# Patient Record
Sex: FEMALE | HISPANIC OR LATINO | Employment: FULL TIME
[De-identification: names, ages, dates, MRNs, and addresses within clinical notes are randomized per-mention and may not be internally consistent; named-entity substitution may affect disease eponyms.]

---

## 2017-07-08 ENCOUNTER — HEALTH MAINTENANCE LETTER (OUTPATIENT)
Age: 42
End: 2017-07-08

## 2019-11-02 ENCOUNTER — HEALTH MAINTENANCE LETTER (OUTPATIENT)
Age: 44
End: 2019-11-02

## 2020-02-23 ASSESSMENT — ENCOUNTER SYMPTOMS
HEADACHES: 0
EYE PAIN: 0
DIARRHEA: 0
COUGH: 0
CHILLS: 0
BREAST MASS: 0
ABDOMINAL PAIN: 0
HEMATOCHEZIA: 0
DIZZINESS: 0
CONSTIPATION: 0
FREQUENCY: 0
FEVER: 0
HEMATURIA: 0
NERVOUS/ANXIOUS: 0

## 2020-02-26 ENCOUNTER — OFFICE VISIT (OUTPATIENT)
Dept: PEDIATRICS | Facility: CLINIC | Age: 45
End: 2020-02-26
Payer: COMMERCIAL

## 2020-02-26 VITALS
OXYGEN SATURATION: 100 % | TEMPERATURE: 98.2 F | BODY MASS INDEX: 26.96 KG/M2 | DIASTOLIC BLOOD PRESSURE: 78 MMHG | HEART RATE: 74 BPM | WEIGHT: 137.3 LBS | SYSTOLIC BLOOD PRESSURE: 108 MMHG | HEIGHT: 60 IN | RESPIRATION RATE: 16 BRPM

## 2020-02-26 DIAGNOSIS — Z12.31 ENCOUNTER FOR SCREENING MAMMOGRAM FOR BREAST CANCER: ICD-10-CM

## 2020-02-26 DIAGNOSIS — Z83.3 FAMILY HISTORY OF DIABETES MELLITUS: Primary | ICD-10-CM

## 2020-02-26 DIAGNOSIS — Z00.00 ROUTINE GENERAL MEDICAL EXAMINATION AT A HEALTH CARE FACILITY: ICD-10-CM

## 2020-02-26 DIAGNOSIS — Z12.4 SCREENING FOR MALIGNANT NEOPLASM OF CERVIX: ICD-10-CM

## 2020-02-26 LAB
ANION GAP SERPL CALCULATED.3IONS-SCNC: 6 MMOL/L (ref 3–14)
BUN SERPL-MCNC: 16 MG/DL (ref 7–30)
CALCIUM SERPL-MCNC: 9.1 MG/DL (ref 8.5–10.1)
CHLORIDE SERPL-SCNC: 105 MMOL/L (ref 94–109)
CHOLEST SERPL-MCNC: 263 MG/DL
CO2 SERPL-SCNC: 24 MMOL/L (ref 20–32)
CREAT SERPL-MCNC: 0.67 MG/DL (ref 0.52–1.04)
GFR SERPL CREATININE-BSD FRML MDRD: >90 ML/MIN/{1.73_M2}
GLUCOSE SERPL-MCNC: 87 MG/DL (ref 70–99)
HBA1C MFR BLD: 5.5 % (ref 0–5.6)
HDLC SERPL-MCNC: 83 MG/DL
LDLC SERPL CALC-MCNC: 163 MG/DL
NONHDLC SERPL-MCNC: 180 MG/DL
POTASSIUM SERPL-SCNC: 3.9 MMOL/L (ref 3.4–5.3)
SODIUM SERPL-SCNC: 135 MMOL/L (ref 133–144)
TRIGL SERPL-MCNC: 83 MG/DL

## 2020-02-26 PROCEDURE — 87624 HPV HI-RISK TYP POOLED RSLT: CPT | Performed by: NURSE PRACTITIONER

## 2020-02-26 PROCEDURE — 80061 LIPID PANEL: CPT | Performed by: NURSE PRACTITIONER

## 2020-02-26 PROCEDURE — 80048 BASIC METABOLIC PNL TOTAL CA: CPT | Performed by: NURSE PRACTITIONER

## 2020-02-26 PROCEDURE — 99386 PREV VISIT NEW AGE 40-64: CPT | Performed by: NURSE PRACTITIONER

## 2020-02-26 PROCEDURE — 83036 HEMOGLOBIN GLYCOSYLATED A1C: CPT | Performed by: NURSE PRACTITIONER

## 2020-02-26 PROCEDURE — G0145 SCR C/V CYTO,THINLAYER,RESCR: HCPCS | Performed by: NURSE PRACTITIONER

## 2020-02-26 PROCEDURE — 36415 COLL VENOUS BLD VENIPUNCTURE: CPT | Performed by: NURSE PRACTITIONER

## 2020-02-26 ASSESSMENT — ENCOUNTER SYMPTOMS
CONSTIPATION: 0
COUGH: 0
DIARRHEA: 0
DIZZINESS: 0
FEVER: 0
BREAST MASS: 0
HEMATOCHEZIA: 0
FREQUENCY: 0
NERVOUS/ANXIOUS: 0
ABDOMINAL PAIN: 0
HEADACHES: 0
EYE PAIN: 0
CHILLS: 0
HEMATURIA: 0

## 2020-02-26 ASSESSMENT — MIFFLIN-ST. JEOR: SCORE: 1184.29

## 2020-02-26 NOTE — PROGRESS NOTES
SUBJECTIVE:   CC: Bekah Paez is an 44 year old woman who presents for preventive health visit.     Healthy Habits:     Getting at least 3 servings of Calcium per day:  Yes    Bi-annual eye exam:  Yes    Dental care twice a year:  Yes    Sleep apnea or symptoms of sleep apnea:  None    Diet:  Regular (no restrictions)    Frequency of exercise:  2-3 days/week    Duration of exercise:  45-60 minutes    Taking medications regularly:  Yes    Medication side effects:  Not applicable    PHQ-2 Total Score: 0    Additional concerns today:  No    She works for [employer]  Has not been to a clinic in several years  Denies any concerns today  Periods are regular  Uses condoms for pregnancy prevention  Asks about birth control  Family history of diabetes      Today's PHQ-2 Score:   PHQ-2 ( 1999 [employer]) 2/23/2020   Q1: Little interest or pleasure in doing things 0   Q2: Feeling down, depressed or hopeless 0   PHQ-2 Score 0   Q1: Little interest or pleasure in doing things Not at all   Q2: Feeling down, depressed or hopeless Not at all   PHQ-2 Score 0       Abuse: Current or Past(Physical, Sexual or Emotional)- No  Do you feel safe in your environment? Yes        Social History     Tobacco Use     Smoking status: Never Smoker     Smokeless tobacco: Never Used   Substance Use Topics     Alcohol use: Yes     Comment: social drinker, hh with friends, parties, etc. 2 drinks max         Alcohol Use 2/23/2020   Prescreen: >3 drinks/day or >7 drinks/week? No       Reviewed orders with patient.  Reviewed health maintenance and updated orders accordingly - Yes  Lab work is in process  Labs reviewed in EPIC  BP Readings from Last 3 Encounters:   02/26/20 108/78   01/14/14 114/77   09/21/12 98/68    Wt Readings from Last 3 Encounters:   02/26/20 62.3 kg (137 lb 4.8 oz)   09/21/12 55.5 kg (122 lb 6.4 oz)   08/10/12 66.7 kg (147 lb)                  Patient Active Problem List   Diagnosis     Allergic rhinitis     Lumbago      Nonallopathic lesion of sacral region     Nonallopathic lesion of thoracic region     CARDIOVASCULAR SCREENING; LDL GOAL LESS THAN 160     Blood type A+     History reviewed. No pertinent surgical history.    Social History     Tobacco Use     Smoking status: Never Smoker     Smokeless tobacco: Never Used   Substance Use Topics     Alcohol use: Yes     Comment: social drinker, hh with friends, parties, etc. 2 drinks max     Family History   Problem Relation Age of Onset     Lipids Mother      Diabetes Mother      Hypertension Mother      Depression Mother      Obesity Mother      Cerebrovascular Disease Father      Diabetes Brother          No current outpatient medications on file.       Mammogram Screening: Patient under age 50, mutual decision reflected in health maintenance.      Pertinent mammograms are reviewed under the imaging tab.  History of abnormal Pap smear: NO - age 30-65 PAP every 5 years with negative HPV co-testing recommended  PAP / HPV 1/27/2012 11/2/2009 7/11/2008   PAP NIL ASC-US(A) NIL     Reviewed and updated as needed this visit by clinical staff  Tobacco  Allergies  Med Hx  Surg Hx  Fam Hx  Soc Hx        Reviewed and updated as needed this visit by Provider        Past Medical History:   Diagnosis Date     Allergic rhinitis, cause unspecified      Pure hypercholesterolemia         Review of Systems   Constitutional: Negative for chills and fever.   HENT: Negative for congestion and ear pain.    Eyes: Negative for pain.   Respiratory: Negative for cough.    Cardiovascular: Negative for chest pain.   Gastrointestinal: Negative for abdominal pain, constipation, diarrhea and hematochezia.   Breasts:  Negative for breast mass.   Genitourinary: Negative for frequency, genital sores, hematuria, pelvic pain and vaginal discharge.   Neurological: Negative for dizziness and headaches.   Psychiatric/Behavioral: The patient is not nervous/anxious.         OBJECTIVE:   /78 (BP Location:  Right arm, Patient Position: Chair, Cuff Size: Adult Regular)   Pulse 74   Temp 98.2  F (36.8  C) (Oral)   Resp 16   Ht 1.524 m (5')   Wt 62.3 kg (137 lb 4.8 oz)   SpO2 100%   BMI 26.81 kg/m    Physical Exam  GENERAL: healthy, alert and no distress  EYES: Eyes grossly normal to inspection, PERRL and conjunctivae and sclerae normal  HENT: ear canals and TM's normal, nose and mouth without ulcers or lesions  NECK: no adenopathy, no asymmetry, masses, or scars and thyroid normal to palpation  RESP: lungs clear to auscultation - no rales, rhonchi or wheezes  BREAST: normal without masses, tenderness or nipple discharge and no palpable axillary masses or adenopathy  CV: regular rate and rhythm, normal S1 S2, no S3 or S4, no murmur, click or rub, no peripheral edema and peripheral pulses strong  ABDOMEN: soft, nontender, no hepatosplenomegaly, no masses and bowel sounds normal   (female): normal female external genitalia, normal urethral meatus, vaginal mucosa pink, moist, well rugated, and normal cervix/adnexa/uterus without masses or discharge  MS: no gross musculoskeletal defects noted, no edema  SKIN: no suspicious lesions or rashes  NEURO: Normal strength and tone, mentation intact and speech normal  PSYCH: mentation appears normal, affect normal/bright    Diagnostic Test Results:  Labs reviewed in Epic    ASSESSMENT/PLAN:       ICD-10-CM    1. Family history of diabetes mellitus Z83.3 Hemoglobin A1c   2. Routine general medical examination at a health care facility Z00.00 Lipid panel reflex to direct LDL Non-fasting     Basic metabolic panel   3. Screening for malignant neoplasm of cervix Z12.4 Pap imaged thin layer screen with HPV - recommended age 30 - 65 years (select HPV order below)     HPV High Risk Types DNA Cervical   4. Encounter for screening mammogram for breast cancer Z12.31 MA Screen Bilateral w/Vish     We discussed birth control options including LARCs. Reviewed risks and benefits. Category 2  for MARSHA which she is leaning towards. Does not want IUD. Consider Nexplanon. Can call clinic if wanting me to order COCs as we already discussed it. If interesting in Nexplanon, advised to schedule with our midwives    COUNSELING:  Reviewed preventive health counseling, as reflected in patient instructions       Regular exercise       Healthy diet/nutrition       Contraception       Family planning    Estimated body mass index is 26.81 kg/m  as calculated from the following:    Height as of this encounter: 1.524 m (5').    Weight as of this encounter: 62.3 kg (137 lb 4.8 oz).         reports that she has never smoked. She has never used smokeless tobacco.      Counseling Resources:  ATP IV Guidelines  Pooled Cohorts Equation Calculator  Breast Cancer Risk Calculator  FRAX Risk Assessment  ICSI Preventive Guidelines  Dietary Guidelines for Americans, 2010  USDA's MyPlate  ASA Prophylaxis  Lung CA Screening    [facility]

## 2020-02-27 ENCOUNTER — TRANSFERRED RECORDS (OUTPATIENT)
Dept: HEALTH INFORMATION MANAGEMENT | Facility: CLINIC | Age: 45
End: 2020-02-27

## 2020-02-28 LAB
COPATH REPORT: NORMAL
PAP: NORMAL

## 2020-03-02 LAB
FINAL DIAGNOSIS: NORMAL
HPV HR 12 DNA CVX QL NAA+PROBE: NEGATIVE
HPV16 DNA SPEC QL NAA+PROBE: NEGATIVE
HPV18 DNA SPEC QL NAA+PROBE: NEGATIVE
SPECIMEN DESCRIPTION: NORMAL
SPECIMEN SOURCE CVX/VAG CYTO: NORMAL

## 2020-03-02 NOTE — RESULT ENCOUNTER NOTE
Bekah Paez,    Your lab results have been released to [employer].   Your cholesterol is elevated, but not high enough that you need to be treated with a medication. Reduce your consumption of cholesterol and saturated fats and eliminate trans fats from your diet. Increase your consumption of healthy fats (nuts, fish, seafood, avocado, olive oil), whole grains and fruits and vegetables. I also recommend exercising 150 minutes a week. These changes will help to improve your cholesterol.   The remainder of your labs are stable.   Please call the clinic if you have any concerns [phone]    [facility]

## 2020-03-13 ENCOUNTER — ANCILLARY PROCEDURE (OUTPATIENT)
Dept: MAMMOGRAPHY | Facility: CLINIC | Age: 45
End: 2020-03-13
Payer: COMMERCIAL

## 2020-03-13 DIAGNOSIS — Z12.31 ENCOUNTER FOR SCREENING MAMMOGRAM FOR BREAST CANCER: ICD-10-CM

## 2020-03-13 PROCEDURE — 77067 SCR MAMMO BI INCL CAD: CPT | Mod: TC

## 2020-03-13 PROCEDURE — 77063 BREAST TOMOSYNTHESIS BI: CPT | Mod: TC

## 2020-03-20 ENCOUNTER — HOSPITAL ENCOUNTER (OUTPATIENT)
Dept: ULTRASOUND IMAGING | Facility: CLINIC | Age: 45
Discharge: HOME | End: 2020-03-20
Attending: NURSE PRACTITIONER | Admitting: NURSE PRACTITIONER
Payer: COMMERCIAL

## 2020-03-20 DIAGNOSIS — R92.8 ABNORMAL MAMMOGRAM: ICD-10-CM

## 2020-03-20 PROCEDURE — 76642 ULTRASOUND BREAST LIMITED: CPT | Mod: RT

## 2020-12-02 ENCOUNTER — OFFICE VISIT (OUTPATIENT)
Dept: PEDIATRICS | Facility: CLINIC | Age: 45
End: 2020-12-02
Payer: COMMERCIAL

## 2020-12-02 VITALS
DIASTOLIC BLOOD PRESSURE: 70 MMHG | TEMPERATURE: 99.2 F | BODY MASS INDEX: 26.52 KG/M2 | HEART RATE: 71 BPM | OXYGEN SATURATION: 98 % | RESPIRATION RATE: 14 BRPM | WEIGHT: 135.8 LBS | SYSTOLIC BLOOD PRESSURE: 116 MMHG

## 2020-12-02 DIAGNOSIS — M54.16 LUMBAR RADICULOPATHY: Primary | ICD-10-CM

## 2020-12-02 DIAGNOSIS — F40.240 CLAUSTROPHOBIA: ICD-10-CM

## 2020-12-02 PROCEDURE — 99214 OFFICE O/P EST MOD 30 MIN: CPT | Performed by: PHYSICIAN ASSISTANT

## 2020-12-02 RX ORDER — DIAZEPAM 5 MG
TABLET ORAL
Qty: 1 TABLET | Refills: 0 | Status: SHIPPED | OUTPATIENT
Start: 2020-12-02 | End: 2022-08-01

## 2020-12-02 NOTE — PROGRESS NOTES
Subjective     Bekah Paez is a 45 year old female who presents to clinic today for the following health issues:    HPI  Back Pain  Onset/Duration: x 7 months  Description:   Location of pain: gluteus right and hip right  Character of pain: sharp and intermittent  Pain radiation: radiates into the right foot  New numbness or weakness in legs, not attributed to pain: YES- numbness and tingling   Intensity: Currently 3/10, At its worst 10/10  Progression of Symptoms: worsening  History:   Specific cause: lifting  Pain interferes with job: YES  History of back problems: no prior back problems  Any previous MRI or X-rays: Yes- at [facility].  Date 10/2020   Sees a specialist for back pain: No  Alleviating factors:   Improved by: ibuprofen, benadryl     Precipitating factors:  Worsened by: Standing, Sitting, Lying Flat and Walking  Therapies tried and outcome: acetaminophen (Tylenol), temperary effective, warm baths- not effective     Accompanying Signs & Symptoms:  Risk of Fracture: None  Risk of Cauda Equina: None  Risk of Infection: None  Risk of Cancer: None  Risk of Ankylosing Spondylitis: Onset at age <35, male, AND morning back stiffness no  Review of Systems   Constitutional, HEENT, cardiovascular, pulmonary, gi and gu systems are negative, except as otherwise noted.      Objective    /70 (BP Location: Right arm, Patient Position: Sitting, Cuff Size: Adult Regular)   Pulse 71   Temp 99.2  F (37.3  C) (Tympanic)   Resp 14   Wt 61.6 kg (135 lb 12.8 oz)   SpO2 98%   BMI 26.52 kg/m    Body mass index is 26.52 kg/m .  Physical Exam   GENERAL: healthy, alert and no distress  Patient Lumbosacral spine area reveals mild right paralumbar pain. Full ROM. Spinal alignment is straight and no list is noted.  Straight leg raise is positive.     No results found for this or any previous visit (from the past 24 hour(s)).        Assessment & Plan   Lumbar radiculopathy  Given duration of symptoms, proceed with  MRI and SPINE follow up.  - MR Lumbar Spine w/o Contrast; Future  - Orthopedic & Spine  Referral; Future    Claustrophobia  - diazepam (VALIUM) 5 MG tablet; Take one tablet one hour prior to procedure     Tawny Talavera PA-C  [facility]

## 2020-12-04 ENCOUNTER — HOSPITAL ENCOUNTER (OUTPATIENT)
Dept: MRI IMAGING | Facility: CLINIC | Age: 45
Discharge: HOME | End: 2020-12-04
Attending: PHYSICIAN ASSISTANT | Admitting: PHYSICIAN ASSISTANT
Payer: COMMERCIAL

## 2020-12-04 DIAGNOSIS — M54.16 LUMBAR RADICULOPATHY: ICD-10-CM

## 2020-12-04 PROCEDURE — 72148 MRI LUMBAR SPINE W/O DYE: CPT

## 2020-12-08 ENCOUNTER — VIRTUAL VISIT (OUTPATIENT)
Dept: NEUROSURGERY | Facility: CLINIC | Age: 45
End: 2020-12-08
Attending: PHYSICIAN ASSISTANT
Payer: COMMERCIAL

## 2020-12-08 VITALS — WEIGHT: 133 LBS | HEIGHT: 61 IN | BODY MASS INDEX: 25.11 KG/M2

## 2020-12-08 DIAGNOSIS — M54.16 LUMBAR RADICULOPATHY: ICD-10-CM

## 2020-12-08 PROCEDURE — 99204 OFFICE O/P NEW MOD 45 MIN: CPT | Mod: GT | Performed by: PHYSICIAN ASSISTANT

## 2020-12-08 RX ORDER — FLUTICASONE PROPIONATE 50 MCG
1 SPRAY, SUSPENSION (ML) NASAL PRN
COMMUNITY
Start: 2020-08-13 | End: 2022-08-01

## 2020-12-08 ASSESSMENT — PAIN SCALES - GENERAL: PAINLEVEL: MILD PAIN (3)

## 2020-12-08 ASSESSMENT — MIFFLIN-ST. JEOR: SCORE: 1179.62

## 2020-12-08 NOTE — PROGRESS NOTES
Video-Visit Details    Type of service:  Video Visit    Video Start Time: 12:35  Video End Time: 12:57    Originating Location (pt. Location): Home    Distant Location (provider location):  [facility] NEUROSURGERY CLINIC [place]     Platform used for Video Visit: [facility]    NEUROSURGERY CLINIC CONSULT NOTE     DATE OF VISIT: 12/8/2020     SUBJECTIVE:     Bekah Paez is a pleasant 45 year old female who presents to the clinic today for consultation on lumbar spine pain with right leg radiculopathy. She is referred to the Neurosurgery Clinic by Dr. Talavera in Primary Care.   Today, she reports a 6-month history of symptoms with a one month exacerbation. She describes constant, sharp, burning pain that initiates in the midline low lumbar region and radiates distally in what sounds like the right S1 distribution. This pain is accompanied by paresthesia, numbness and perceived weakness in the same distribution. Prolonged standing and walking seem to aggravate the symptoms, while alleviation is obtained by lying on her back, belly or simply positional changes. No mechanism of injury such as trauma or a fall is associated with the onset of the pain other than giving birth to three children. There are no bowel or bladder changes. She denies saddle anesthesia. She denies changes in gait, instability, or falling episodes.    She has participated in conservative therapies to include chiropractic care 3-4 times a week, NSAIDs, yoga and a home exercise program. None of these modalities have provided any significant long term relief.         Current Outpatient Medications:      fluticasone (FLONASE) 50 MCG/ACT nasal spray, Spray 1 spray in nostril as needed, Disp: , Rfl:      diazepam (VALIUM) 5 MG tablet, Take one tablet one hour prior to procedure (Patient not taking: Reported on 12/8/2020), Disp: 1 tablet, Rfl: 0     No Known Allergies     Past Medical History:   Diagnosis Date     Allergic rhinitis,  "cause unspecified      Pure hypercholesterolemia         ROS: 10 point review of symptoms are negative other than the symptoms noted above in the HPI.     Family History has been reviewed with the patient, there are no pertinent findings to presenting concern.     No past surgical history on file.     Social History     Tobacco Use     Smoking status: Never Smoker     Smokeless tobacco: Never Used   Substance Use Topics     Alcohol use: Yes     Comment: social drinker, hh with friends, parties, etc. 2 drinks max     Drug use: No        OBJECTIVE:   Ht 5' 1.25\" (1.556 m)   Wt 133 lb (60.3 kg)   BMI 24.93 kg/m     Body mass index is 24.93 kg/m .     Imaging:     MRI LUMBAR SPINE WITHOUT CONTRAST   12/4/2020 2:45 PM     Findings, per radiologist read, notable for:      Mild disc height loss. Disc bulge with superimposed right central disc extrusion measuring approximately 18 x 9 x 11 mm (TR x AP x SI) which contacts, displaces, and compresses the traversing right S1 nerve root within the lateral recess (series 6 image 37). Mild bilateral facet arthropathy. No foraminal stenosis. Mild spinal canal stenosis.    Full radiological report in chart. Imaging was reviewed with with patient today.       ASSESSMENT/PLAN:     Bekah Paez is a 45 year old female who I spoke to today for consultation on lumbar spine pain with right leg radiculopathy. The patient's most recent imaging was reviewed with her today. It was explained that images show a disc bulge with superimposed right central disc extrusion measuring approximately 18 x 9 x 11 mm which contacts, displaces, and compresses the traversing right S1 nerve root within the lateral recess. This does correlate to today's clinical examination. On exam, subjectively, she is noted to have diminished strength and sensation. She has attempted conservative management with chiropractic care 3-4 times a week, NSAIDs, yoga and a home exercise program, without resolution of " symptoms.     We did discuss both conservative and surgical options although the risk of permanent nerve damage is greater with continued delay of relieving the pressure from the nerve due to the stenosis. We discussed participating in a physical therapy program to include lumbar traction. We also discussed obtaining a right L5-S1 epidural steroid injection. However, due to her diminished sensation, strength and sense of unsteadiness in conjunction with her essentially failing conservative measures, we also explained a right L5-S1 microdiscectomy.     We reviewed the surgical risks including nerve damage, infection, bleeding, residual or recurrent disk / symptoms and spinal fluid leak. This will also be performed utilizing general anesthesia which can pose both cardiovascular and respiratory risks as well. We described the procedure as being a minimally invasive same day surgery to be performed at [facility]. We also discussed the normal postoperative recovery that would include not lifting anything greater than 5-10 pounds, avoid excessive bending, twisting, and turning and to make frequent position changes about every 30 minutes going from sitting, standing and walking for six weeks approximately.      We also discussed signs of a worsening problem that she should seek being evaluated. She appeared to have a good understanding of the situation, asked appropriate questions which we answered, and wished to discuss the above options with her . She was given our clinic contact information and has agreed to contact me when she has made a decision.          Respectfully,     ROSEANNE Arnold, LEXI  [facility] Neurosurgery  [facility]  Tel: [phone]  Pager: [phone]

## 2020-12-08 NOTE — LETTER
12/8/2020         RE: Bekah [place]  [address] [place] [address]        Dear Colleague,    Thank you for referring your patient, Bekah Paez, to the [facility] NEUROSURGERY CLINIC [place]. Please see a copy of my visit note below.      Video-Visit Details    Type of service:  Video Visit    Video Start Time: 12:35  Video End Time: 12:57    Originating Location (pt. Location): Home    Distant Location (provider location):  [facility] NEUROSURGERY [facility]     Platform used for Video Visit: [facility]    NEUROSURGERY CLINIC CONSULT NOTE     DATE OF VISIT: 12/8/2020     SUBJECTIVE:     Bekah Paez is a pleasant 45 year old female who presents to the clinic today for consultation on lumbar spine pain with right leg radiculopathy. She is referred to the Neurosurgery Clinic by Dr. Talavera in Primary Care.   Today, she reports a 6-month history of symptoms with a one month exacerbation. She describes constant, sharp, burning pain that initiates in the midline low lumbar region and radiates distally in what sounds like the right S1 distribution. This pain is accompanied by paresthesia, numbness and perceived weakness in the same distribution. Prolonged standing and walking seem to aggravate the symptoms, while alleviation is obtained by lying on her back, belly or simply positional changes. No mechanism of injury such as trauma or a fall is associated with the onset of the pain other than giving birth to three children. There are no bowel or bladder changes. She denies saddle anesthesia. She denies changes in gait, instability, or falling episodes.    She has participated in conservative therapies to include chiropractic care 3-4 times a week, NSAIDs, yoga and a home exercise program. None of these modalities have provided any significant long term relief.         Current Outpatient Medications:      fluticasone (FLONASE) 50 MCG/ACT nasal spray, Spray 1  "spray in nostril as needed, Disp: , Rfl:      diazepam (VALIUM) 5 MG tablet, Take one tablet one hour prior to procedure (Patient not taking: Reported on 12/8/2020), Disp: 1 tablet, Rfl: 0     No Known Allergies     Past Medical History:   Diagnosis Date     Allergic rhinitis, cause unspecified      Pure hypercholesterolemia         ROS: 10 point review of symptoms are negative other than the symptoms noted above in the HPI.     Family History has been reviewed with the patient, there are no pertinent findings to presenting concern.     No past surgical history on file.     Social History     Tobacco Use     Smoking status: Never Smoker     Smokeless tobacco: Never Used   Substance Use Topics     Alcohol use: Yes     Comment: social drinker, hh with friends, parties, etc. 2 drinks max     Drug use: No        OBJECTIVE:   Ht 5' 1.25\" (1.556 m)   Wt 133 lb (60.3 kg)   BMI 24.93 kg/m     Body mass index is 24.93 kg/m .     Imaging:     MRI LUMBAR SPINE WITHOUT CONTRAST   12/4/2020 2:45 PM     Findings, per radiologist read, notable for:      Mild disc height loss. Disc bulge with superimposed right central disc extrusion measuring approximately 18 x 9 x 11 mm (TR x AP x SI) which contacts, displaces, and compresses the traversing right S1 nerve root within the lateral recess (series 6 image 37). Mild bilateral facet arthropathy. No foraminal stenosis. Mild spinal canal stenosis.    Full radiological report in chart. Imaging was reviewed with with patient today.       ASSESSMENT/PLAN:     Bekah Paez is a 45 year old female who I spoke to today for consultation on lumbar spine pain with right leg radiculopathy. The patient's most recent imaging was reviewed with her today. It was explained that images show a disc bulge with superimposed right central disc extrusion measuring approximately 18 x 9 x 11 mm which contacts, displaces, and compresses the traversing right S1 nerve root within the lateral recess. This " does correlate to today's clinical examination. On exam, subjectively, she is noted to have diminished strength and sensation. She has attempted conservative management with chiropractic care 3-4 times a week, NSAIDs, yoga and a home exercise program, without resolution of symptoms.     We did discuss both conservative and surgical options although the risk of permanent nerve damage is greater with continued delay of relieving the pressure from the nerve due to the stenosis. We discussed participating in a physical therapy program to include lumbar traction. We also discussed obtaining a right L5-S1 epidural steroid injection. However, due to her diminished sensation, strength and sense of unsteadiness in conjunction with her essentially failing conservative measures, we also explained a right L5-S1 microdiscectomy.     We reviewed the surgical risks including nerve damage, infection, bleeding, residual or recurrent disk / symptoms and spinal fluid leak. This will also be performed utilizing general anesthesia which can pose both cardiovascular and respiratory risks as well. We described the procedure as being a minimally invasive same day surgery to be performed at [facility]. We also discussed the normal postoperative recovery that would include not lifting anything greater than 5-10 pounds, avoid excessive bending, twisting, and turning and to make frequent position changes about every 30 minutes going from sitting, standing and walking for six weeks approximately.      We also discussed signs of a worsening problem that she should seek being evaluated. She appeared to have a good understanding of the situation, asked appropriate questions which we answered, and wished to discuss the above options with her . She was given our clinic contact information and has agreed to contact me when she has made a decision.          Respectfully,     ROSEANNE Arnold, LEXI  [facility]  Neurosurgery  [facility]  Tel: [phone]  Pager: [phone]         Again, thank you for allowing me to participate in the care of your patient.        Sincerely,        Chuck Mckay PA-C

## 2020-12-08 NOTE — NURSING NOTE
"Bekah Paez is a 45 year old female who is being evaluated via a billable video visit.      The patient has been notified of following:     \"This video visit will be conducted via a call between you and your physician/provider. We have found that certain health care needs can be provided without the need for an in-person physical exam.  This service lets us provide the care you need with a video conversation.  If a prescription is necessary we can send it directly to your pharmacy.  If lab work is needed we can place an order for that and you can then stop by our lab to have the test done at a later time.    Video visits are billed at different rates depending on your insurance coverage.  Please reach out to your insurance provider with any questions.    If during the course of the call the physician/provider feels a video visit is not appropriate, you will not be charged for this service.\"    Patient has given verbal consent for Video visit? Yes  How would you like to obtain your AVS? MyChart  If you are dropped from the video visit, the video invite should be resent to: Text to cell phone: [phone]  Will anyone else be joining your video visit? No        Carlo Elias CMA on 12/8/2020 at 10:59 AM    "

## 2021-01-27 ENCOUNTER — TELEPHONE (OUTPATIENT)
Dept: PEDIATRICS | Facility: CLINIC | Age: 46
End: 2021-01-27

## 2021-01-27 DIAGNOSIS — M54.16 LUMBAR RADICULOPATHY: Primary | ICD-10-CM

## 2021-01-27 NOTE — TELEPHONE ENCOUNTER
Inez from [facility] was calling to have a referral placed for Physical Therapy for the pt to eval and treat for Low back pain. The order when completed can be faxed to [phone].   Bianka Winters on 1/27/2021 at 10:01 AM

## 2021-02-03 ENCOUNTER — TELEPHONE (OUTPATIENT)
Dept: PEDIATRICS | Facility: CLINIC | Age: 46
End: 2021-02-03

## 2021-02-03 NOTE — TELEPHONE ENCOUNTER
Reason for Call:  Other orders for physical therapy    Detailed comments: you fax 1 order ref#: [id number]   She needs 6-8 visits for insurance to let her continue therapy  Fax it to: [phone]    Phone Number Patient can be reached at:  [phone]    Best Time: anytime    Can we leave a detailed message on this number? YES    Call taken on 2/3/2021 at 9:28 AM by Aimee Godfrey

## 2021-02-03 NOTE — TELEPHONE ENCOUNTER
MA/TC-    Please fax PT order and indicate verbal OK for 6-8 visits. Fax [phone]. Please reference #[id number]    Thank you. Gin Unger RN on 2/3/2021 at 11:53 AM

## 2021-02-03 NOTE — TELEPHONE ENCOUNTER
The referral has been faxed and received to the number provided below.  Bianka Winters on 2/3/2021 at 12:09 PM

## 2021-04-03 ENCOUNTER — HEALTH MAINTENANCE LETTER (OUTPATIENT)
Age: 46
End: 2021-04-03

## 2021-05-23 ENCOUNTER — HEALTH MAINTENANCE LETTER (OUTPATIENT)
Age: 46
End: 2021-05-23

## 2021-09-12 ENCOUNTER — HEALTH MAINTENANCE LETTER (OUTPATIENT)
Age: 46
End: 2021-09-12

## 2022-04-24 ENCOUNTER — HEALTH MAINTENANCE LETTER (OUTPATIENT)
Age: 47
End: 2022-04-24

## 2022-06-19 ENCOUNTER — HEALTH MAINTENANCE LETTER (OUTPATIENT)
Age: 47
End: 2022-06-19

## 2022-08-01 ENCOUNTER — OFFICE VISIT (OUTPATIENT)
Dept: PEDIATRICS | Facility: CLINIC | Age: 47
End: 2022-08-01
Payer: COMMERCIAL

## 2022-08-01 VITALS
BODY MASS INDEX: 28.13 KG/M2 | HEART RATE: 68 BPM | DIASTOLIC BLOOD PRESSURE: 68 MMHG | TEMPERATURE: 97.7 F | HEIGHT: 60 IN | WEIGHT: 143.3 LBS | OXYGEN SATURATION: 99 % | SYSTOLIC BLOOD PRESSURE: 110 MMHG | RESPIRATION RATE: 16 BRPM

## 2022-08-01 DIAGNOSIS — G47.01 INSOMNIA DUE TO MEDICAL CONDITION: ICD-10-CM

## 2022-08-01 DIAGNOSIS — Z12.31 VISIT FOR SCREENING MAMMOGRAM: ICD-10-CM

## 2022-08-01 DIAGNOSIS — J30.9 ALLERGIC RHINITIS, UNSPECIFIED SEASONALITY, UNSPECIFIED TRIGGER: ICD-10-CM

## 2022-08-01 DIAGNOSIS — Z13.220 LIPID SCREENING: ICD-10-CM

## 2022-08-01 DIAGNOSIS — Z11.59 NEED FOR HEPATITIS C SCREENING TEST: ICD-10-CM

## 2022-08-01 DIAGNOSIS — Z12.11 SCREEN FOR COLON CANCER: ICD-10-CM

## 2022-08-01 DIAGNOSIS — Z13.1 SCREENING FOR DIABETES MELLITUS: ICD-10-CM

## 2022-08-01 DIAGNOSIS — Z00.00 ROUTINE GENERAL MEDICAL EXAMINATION AT A HEALTH CARE FACILITY: Primary | ICD-10-CM

## 2022-08-01 LAB — HBA1C MFR BLD: 5.8 % (ref 0–5.6)

## 2022-08-01 PROCEDURE — 87902 NFCT AGT GNTYP ALYS HEP C: CPT | Performed by: INTERNAL MEDICINE

## 2022-08-01 PROCEDURE — 36415 COLL VENOUS BLD VENIPUNCTURE: CPT | Performed by: INTERNAL MEDICINE

## 2022-08-01 PROCEDURE — 99396 PREV VISIT EST AGE 40-64: CPT | Mod: 25 | Performed by: INTERNAL MEDICINE

## 2022-08-01 PROCEDURE — 83036 HEMOGLOBIN GLYCOSYLATED A1C: CPT | Performed by: INTERNAL MEDICINE

## 2022-08-01 PROCEDURE — 90471 IMMUNIZATION ADMIN: CPT | Performed by: INTERNAL MEDICINE

## 2022-08-01 PROCEDURE — 86803 HEPATITIS C AB TEST: CPT | Performed by: INTERNAL MEDICINE

## 2022-08-01 PROCEDURE — 90715 TDAP VACCINE 7 YRS/> IM: CPT | Performed by: INTERNAL MEDICINE

## 2022-08-01 PROCEDURE — 80061 LIPID PANEL: CPT | Performed by: INTERNAL MEDICINE

## 2022-08-01 PROCEDURE — 99213 OFFICE O/P EST LOW 20 MIN: CPT | Mod: 25 | Performed by: INTERNAL MEDICINE

## 2022-08-01 RX ORDER — TRAZODONE HYDROCHLORIDE 50 MG/1
25-50 TABLET, FILM COATED ORAL
Qty: 90 TABLET | Refills: 1 | Status: SHIPPED | OUTPATIENT
Start: 2022-08-01

## 2022-08-01 RX ORDER — FLUTICASONE PROPIONATE 50 MCG
1 SPRAY, SUSPENSION (ML) NASAL PRN
Qty: 16 ML | Refills: 11 | Status: SHIPPED | OUTPATIENT
Start: 2022-08-01

## 2022-08-01 SDOH — ECONOMIC STABILITY: FOOD INSECURITY: WITHIN THE PAST 12 MONTHS, YOU WORRIED THAT YOUR FOOD WOULD RUN OUT BEFORE YOU GOT MONEY TO BUY MORE.: NEVER TRUE

## 2022-08-01 SDOH — HEALTH STABILITY: PHYSICAL HEALTH: ON AVERAGE, HOW MANY MINUTES DO YOU ENGAGE IN EXERCISE AT THIS LEVEL?: 30 MIN

## 2022-08-01 SDOH — ECONOMIC STABILITY: TRANSPORTATION INSECURITY
IN THE PAST 12 MONTHS, HAS THE LACK OF TRANSPORTATION KEPT YOU FROM MEDICAL APPOINTMENTS OR FROM GETTING MEDICATIONS?: NO

## 2022-08-01 SDOH — ECONOMIC STABILITY: FOOD INSECURITY: WITHIN THE PAST 12 MONTHS, THE FOOD YOU BOUGHT JUST DIDN'T LAST AND YOU DIDN'T HAVE MONEY TO GET MORE.: NEVER TRUE

## 2022-08-01 SDOH — ECONOMIC STABILITY: INCOME INSECURITY: HOW HARD IS IT FOR YOU TO PAY FOR THE VERY BASICS LIKE FOOD, HOUSING, MEDICAL CARE, AND HEATING?: NOT HARD AT ALL

## 2022-08-01 SDOH — ECONOMIC STABILITY: TRANSPORTATION INSECURITY
IN THE PAST 12 MONTHS, HAS LACK OF TRANSPORTATION KEPT YOU FROM MEETINGS, WORK, OR FROM GETTING THINGS NEEDED FOR DAILY LIVING?: NO

## 2022-08-01 SDOH — HEALTH STABILITY: PHYSICAL HEALTH: ON AVERAGE, HOW MANY DAYS PER WEEK DO YOU ENGAGE IN MODERATE TO STRENUOUS EXERCISE (LIKE A BRISK WALK)?: 4 DAYS

## 2022-08-01 SDOH — ECONOMIC STABILITY: INCOME INSECURITY: IN THE LAST 12 MONTHS, WAS THERE A TIME WHEN YOU WERE NOT ABLE TO PAY THE MORTGAGE OR RENT ON TIME?: NO

## 2022-08-01 ASSESSMENT — SOCIAL DETERMINANTS OF HEALTH (SDOH)
HOW OFTEN DO YOU GET TOGETHER WITH FRIENDS OR RELATIVES?: TWICE A WEEK
IN A TYPICAL WEEK, HOW MANY TIMES DO YOU TALK ON THE PHONE WITH FAMILY, FRIENDS, OR NEIGHBORS?: NEVER
DO YOU BELONG TO ANY CLUBS OR ORGANIZATIONS SUCH AS CHURCH GROUPS UNIONS, FRATERNAL OR ATHLETIC GROUPS, OR SCHOOL GROUPS?: YES
HOW OFTEN DO YOU ATTEND CHURCH OR RELIGIOUS SERVICES?: MORE THAN 4 TIMES PER YEAR

## 2022-08-01 ASSESSMENT — ENCOUNTER SYMPTOMS
HEMATOCHEZIA: 0
ARTHRALGIAS: 0
BREAST MASS: 0
NERVOUS/ANXIOUS: 0
FEVER: 0
CHILLS: 0
FREQUENCY: 0
PARESTHESIAS: 0
HEADACHES: 0
HEMATURIA: 0
JOINT SWELLING: 0
SHORTNESS OF BREATH: 0
HEARTBURN: 0
MYALGIAS: 0
COUGH: 0
DYSURIA: 0
DIARRHEA: 0
PALPITATIONS: 0
CONSTIPATION: 0
DIZZINESS: 0
ABDOMINAL PAIN: 0
EYE PAIN: 0
WEAKNESS: 0
NAUSEA: 0
SORE THROAT: 0

## 2022-08-01 ASSESSMENT — LIFESTYLE VARIABLES
SKIP TO QUESTIONS 9-10: 1
HOW OFTEN DO YOU HAVE A DRINK CONTAINING ALCOHOL: 2-4 TIMES A MONTH
AUDIT-C TOTAL SCORE: 2
HOW OFTEN DO YOU HAVE SIX OR MORE DRINKS ON ONE OCCASION: NEVER
HOW MANY STANDARD DRINKS CONTAINING ALCOHOL DO YOU HAVE ON A TYPICAL DAY: 1 OR 2

## 2022-08-01 ASSESSMENT — PAIN SCALES - GENERAL: PAINLEVEL: NO PAIN (0)

## 2022-08-01 NOTE — PROGRESS NOTES
SUBJECTIVE:   CC: Bekah Paez is an 46 year old woman who presents for preventive health visit.       Patient has been advised of split billing requirements and indicates understanding: Yes  Healthy Habits:     Getting at least 3 servings of Calcium per day:  NO    Bi-annual eye exam:  Yes    Dental care twice a year:  Yes    Sleep apnea or symptoms of sleep apnea:  Daytime drowsiness    Diet:  Regular (no restrictions)    Frequency of exercise:  4-5 days/week    Duration of exercise:  30-45 minutes    Taking medications regularly:  Yes    Medication side effects:  Not applicable    PHQ-2 Total Score: 0    Additional concerns today:  Yes      Last period was in February. Prior to that was a little heavier and more irregular. Has been having a hard time with hot flashes affecting her sleep.     Interested in medication to help with sleep. Has been trying melatonin, 6mg, 30 min before bedtime.     Today's PHQ-2 Score:   PHQ-2 ( 1999 Pfizer) 8/1/2022   Q1: Little interest or pleasure in doing things 0   Q2: Feeling down, depressed or hopeless 0   PHQ-2 Score 0   PHQ-2 Total Score (12-17 Years)- Positive if 3 or more points; Administer PHQ-A if positive -   Q1: Little interest or pleasure in doing things Not at all   Q2: Feeling down, depressed or hopeless Not at all   PHQ-2 Score 0       Abuse: Current or Past (Physical, Sexual or Emotional) - No  Do you feel safe in your environment? Yes    Have you ever done Advance Care Planning? (For example, a Health Directive, POLST, or a discussion with a medical provider or your loved ones about your wishes): No, advance care planning information given to patient to review.  Patient declined advance care planning discussion at this time.    Social History     Tobacco Use     Smoking status: Never Smoker     Smokeless tobacco: Never Used   Substance Use Topics     Alcohol use: Yes     Comment: social drinker, hh with friends, parties, etc. 2 drinks max     If you drink  alcohol do you typically have >3 drinks per day or >7 drinks per week? No    Alcohol Use 8/1/2022   Prescreen: >3 drinks/day or >7 drinks/week? No       Reviewed orders with patient.  Reviewed health maintenance and updated orders accordingly - Yes  Patient Active Problem List   Diagnosis     Allergic rhinitis     Lumbago     Nonallopathic lesion of sacral region     Nonallopathic lesion of thoracic region     CARDIOVASCULAR SCREENING; LDL GOAL LESS THAN 160     Blood type A+     Pre-diabetes     History reviewed. No pertinent surgical history.    Social History     Tobacco Use     Smoking status: Never Smoker     Smokeless tobacco: Never Used   Substance Use Topics     Alcohol use: Yes     Comment: social drinker, hh with friends, parties, etc. 2 drinks max     Family History   Problem Relation Age of Onset     Lipids Mother      Diabetes Mother      Hypertension Mother      Depression Mother      Obesity Mother      Cerebrovascular Disease Father      Diabetes Brother            Breast Cancer Screening:    Breast CA Risk Assessment (FHS-7) 8/1/2022   Do you have a family history of breast, colon, or ovarian cancer? No / Unknown       click delete button to remove this line now  Mammogram Screening: Recommended annual mammography  Pertinent mammograms are reviewed under the imaging tab.    History of abnormal Pap smear: NO - age 30-65 PAP every 5 years with negative HPV co-testing recommended  PAP / HPV Latest Ref Rng & Units 2/26/2020 1/27/2012 11/2/2009   PAP (Historical) - NIL NIL ASC-US(A)   HPV16 NEG:Negative Negative - -   HPV18 NEG:Negative Negative - -   HRHPV NEG:Negative Negative - -     Reviewed and updated as needed this visit by clinical staff   Tobacco  Allergies    Med Hx  Surg Hx  Fam Hx  Soc Hx          Reviewed and updated as needed this visit by Provider     Meds                   Review of Systems   Constitutional: Negative for chills and fever.   HENT: Negative for congestion, ear pain,  hearing loss and sore throat.    Eyes: Negative for pain and visual disturbance.   Respiratory: Negative for cough and shortness of breath.    Cardiovascular: Negative for chest pain, palpitations and peripheral edema.   Gastrointestinal: Negative for abdominal pain, constipation, diarrhea, heartburn, hematochezia and nausea.   Breasts:  Negative for tenderness, breast mass and discharge.   Genitourinary: Negative for dysuria, frequency, genital sores, hematuria, pelvic pain, urgency, vaginal bleeding and vaginal discharge.   Musculoskeletal: Negative for arthralgias, joint swelling and myalgias.   Skin: Negative for rash.   Neurological: Negative for dizziness, weakness, headaches and paresthesias.   Psychiatric/Behavioral: Negative for mood changes. The patient is not nervous/anxious.           OBJECTIVE:   /68   Pulse 68   Temp 97.7  F (36.5  C) (Temporal)   Resp 16   Ht 1.524 m (5')   Wt 65 kg (143 lb 4.8 oz)   LMP 02/01/2022 (Within Days)   SpO2 99%   Breastfeeding No   BMI 27.99 kg/m    Physical Exam  GENERAL: healthy, alert and no distress  EYES: Eyes grossly normal to inspection, PERRL and conjunctivae and sclerae normal  HENT: ear canals and TM's normal, nose and mouth without ulcers or lesions  NECK: no adenopathy, no asymmetry, masses, or scars and thyroid normal to palpation  RESP: lungs clear to auscultation - no rales, rhonchi or wheezes  CV: regular rate and rhythm, normal S1 S2, no S3 or S4, no murmur, click or rub, no peripheral edema and peripheral pulses strong  ABDOMEN: soft, nontender, no hepatosplenomegaly, no masses and bowel sounds normal  MS: no gross musculoskeletal defects noted, no edema  SKIN: no suspicious lesions or rashes  NEURO: Normal strength and tone, mentation intact and speech normal  PSYCH: mentation appears normal, affect normal/bright    Diagnostic Test Results:  Labs reviewed in Epic    ASSESSMENT/PLAN:   1. Routine general medical examination at a [facility]  "facility  Counseling as below. Pap up to date.     2. Screen for colon cancer  - COLOGUARD([employer] SCI[employer]ES)    3. Need for hepatitis C screening test  - Hepatitis C Screen Reflex to HCV RNA Quant and Genotype  - Hepatitis C RNA, Quantitative by PCR with Confirmatory Reflex to Genotyping    4. Visit for screening mammogram  - MA SCREENING DIGITAL BILAT - Future  (s+30); Future    5. Allergic rhinitis, unspecified seasonality, unspecified trigger  Reasonably well managed with intranasal steroid.   - fluticasone (FLONASE) 50 MCG/ACT nasal spray; Spray 1 spray in nostril as needed  Dispense: 16 mL; Refill: 11    6. Insomnia due to medical condition  Insomnia related to hot flash symptoms. Discussed HRT, she would like to hold off for now. Interested in trial of trazodone. Reviewed side effects.   - traZODone (DESYREL) 50 MG tablet; Take 0.5-1 tablets (25-50 mg) by mouth nightly as needed for sleep  Dispense: 90 tablet; Refill: 1    7. Lipid screening  - Lipid Profile (Chol, Trig, HDL, LDL calc)    8. Screening for diabetes mellitus  - Hemoglobin A1c          COUNSELING:  Reviewed preventive health counseling, as reflected in patient instructions       Regular exercise       Healthy diet/nutrition       Vision screening       Family planning       Colorectal Cancer Screening       (Sol)menopause management    Estimated body mass index is 24.93 kg/m  as calculated from the following:    Height as of 12/8/20: 1.556 m (5' 1.25\").    Weight as of 12/8/20: 60.3 kg (133 lb).    Weight management plan: Discussed healthy diet and exercise guidelines    She reports that she has never smoked. She has never used smokeless tobacco.      Counseling Resources:  ATP IV Guidelines  Pooled Cohorts Equation Calculator  Breast Cancer Risk Calculator  BRCA-Related Cancer Risk Assessment: FHS-7 Tool  FRAX Risk Assessment  ICSI Preventive Guidelines  Dietary Guidelines for Americans, 2010  USDA's MyPlate  ASA Prophylaxis  Lung CA " Screening    Gisselle Bland MD  [facility]

## 2022-08-01 NOTE — PATIENT INSTRUCTIONS
Let me know if hot flashes become more of a problem - can always talk about other medication options.    Try trazodone 1/2-1 tab nightly as needed for sleep - can make you groggy.     Schedule mammogram and do colon cancer stool test.     Preventive Health Recommendations  Female Ages 40 to 49    Yearly exam:   See your health care provider every year in order to  Review health changes.   Discuss preventive care.    Review your medicines if your doctor prescribed any.    Get a Pap test every three years (unless you have an abnormal result and your provider advises testing more often).    If you get Pap tests with HPV test, you only need to test every 5 years, unless you have an abnormal result. You do not need a Pap test if your uterus was removed (hysterectomy) and you have not had cancer.    You should be tested each year for STDs (sexually transmitted diseases), if you're at risk.   Ask your doctor if you should have a mammogram.    Have a colonoscopy (test for colon cancer) if someone in your family has had colon cancer or polyps before age 50.     Have a cholesterol test every 5 years.     Have a diabetes test (fasting glucose) after age 45. If you are at risk for diabetes, you should have this test every 3 years.    Shots: Get a flu shot each year. Get a tetanus shot every 10 years.     Nutrition:   Eat at least 5 servings of fruits and vegetables each day.  Eat whole-grain bread, whole-wheat pasta and brown rice instead of white grains and rice.  Get adequate Calcium and Vitamin D.      Lifestyle  Exercise at least 150 minutes a week (an average of 30 minutes a day, 5 days a week). This will help you control your weight and prevent disease.  Limit alcohol to one drink per day.  No smoking.   Wear sunscreen to prevent skin cancer.  See your dentist every six months for an exam and cleaning.

## 2022-08-02 LAB
CHOLEST SERPL-MCNC: 281 MG/DL
FASTING STATUS PATIENT QL REPORTED: YES
HCV AB SERPL QL IA: REACTIVE
HDLC SERPL-MCNC: 93 MG/DL
LDLC SERPL CALC-MCNC: 168 MG/DL
NONHDLC SERPL-MCNC: 188 MG/DL
TRIGL SERPL-MCNC: 102 MG/DL

## 2022-08-04 PROBLEM — R73.03 PRE-DIABETES: Status: ACTIVE | Noted: 2022-08-04

## 2022-08-04 LAB — HCV RNA SERPL NAA+PROBE-ACNC: NOT DETECTED IU/ML

## 2022-08-11 LAB — NONINV COLON CA DNA+OCC BLD SCRN STL QL: NORMAL

## 2022-08-15 ENCOUNTER — ANCILLARY PROCEDURE (OUTPATIENT)
Dept: MAMMOGRAPHY | Facility: CLINIC | Age: 47
End: 2022-08-15
Attending: INTERNAL MEDICINE
Payer: COMMERCIAL

## 2022-08-15 DIAGNOSIS — Z12.31 VISIT FOR SCREENING MAMMOGRAM: ICD-10-CM

## 2022-08-15 PROCEDURE — 77067 SCR MAMMO BI INCL CAD: CPT | Mod: TC | Performed by: RADIOLOGY

## 2022-11-17 ENCOUNTER — MYC MEDICAL ADVICE (OUTPATIENT)
Dept: PEDIATRICS | Facility: CLINIC | Age: 47
End: 2022-11-17

## 2022-11-17 DIAGNOSIS — R19.5 POSITIVE COLORECTAL CANCER SCREENING USING COLOGUARD TEST: Primary | ICD-10-CM

## 2022-11-17 LAB — NONINV COLON CA DNA+OCC BLD SCRN STL QL: POSITIVE

## 2022-11-19 ENCOUNTER — HEALTH MAINTENANCE LETTER (OUTPATIENT)
Age: 47
End: 2022-11-19

## 2022-11-21 NOTE — TELEPHONE ENCOUNTER
Please fax referral to [facility].    Gisselle Mcnulty MD  Internal Medicine-Pediatrics

## 2022-12-28 ENCOUNTER — ALLIED HEALTH/NURSE VISIT (OUTPATIENT)
Dept: OPTOMETRY | Facility: CLINIC | Age: 47
End: 2022-12-28
Payer: COMMERCIAL

## 2022-12-28 ENCOUNTER — NURSE TRIAGE (OUTPATIENT)
Dept: PEDIATRICS | Facility: CLINIC | Age: 47
End: 2022-12-28

## 2022-12-28 ENCOUNTER — OFFICE VISIT (OUTPATIENT)
Dept: PEDIATRICS | Facility: CLINIC | Age: 47
End: 2022-12-28
Payer: COMMERCIAL

## 2022-12-28 VITALS
RESPIRATION RATE: 17 BRPM | TEMPERATURE: 98.2 F | HEART RATE: 74 BPM | WEIGHT: 136 LBS | SYSTOLIC BLOOD PRESSURE: 102 MMHG | DIASTOLIC BLOOD PRESSURE: 60 MMHG | HEIGHT: 60 IN | OXYGEN SATURATION: 99 % | BODY MASS INDEX: 26.7 KG/M2

## 2022-12-28 DIAGNOSIS — H57.12 ACUTE LEFT EYE PAIN: ICD-10-CM

## 2022-12-28 DIAGNOSIS — H57.89 REDNESS OF EYE: ICD-10-CM

## 2022-12-28 DIAGNOSIS — H20.00 ACUTE IRITIS OF LEFT EYE: Primary | ICD-10-CM

## 2022-12-28 DIAGNOSIS — H57.12 ACUTE LEFT EYE PAIN: Primary | ICD-10-CM

## 2022-12-28 PROCEDURE — 99214 OFFICE O/P EST MOD 30 MIN: CPT | Performed by: PHYSICIAN ASSISTANT

## 2022-12-28 PROCEDURE — 92002 INTRM OPH EXAM NEW PATIENT: CPT | Performed by: OPTOMETRIST

## 2022-12-28 RX ORDER — CYCLOPENTOLATE HYDROCHLORIDE 10 MG/ML
1 SOLUTION/ DROPS OPHTHALMIC 2 TIMES DAILY
Qty: 5 ML | Refills: 0 | Status: SHIPPED | OUTPATIENT
Start: 2022-12-28 | End: 2023-01-04

## 2022-12-28 RX ORDER — PREDNISOLONE ACETATE 10 MG/ML
1-2 SUSPENSION/ DROPS OPHTHALMIC
Qty: 10 ML | Refills: 0 | Status: SHIPPED | OUTPATIENT
Start: 2022-12-28 | End: 2023-01-01

## 2022-12-28 ASSESSMENT — VISUAL ACUITY: METHOD: SNELLEN - LINEAR

## 2022-12-28 ASSESSMENT — SLIT LAMP EXAM - LIDS: COMMENTS: NORMAL

## 2022-12-28 ASSESSMENT — TONOMETRY
OD_IOP_MMHG: 14
IOP_METHOD: APPLANATION
OS_IOP_MMHG: 10

## 2022-12-28 ASSESSMENT — CUP TO DISC RATIO: OS_RATIO: 0.2

## 2022-12-28 ASSESSMENT — PAIN SCALES - GENERAL: PAINLEVEL: SEVERE PAIN (6)

## 2022-12-28 NOTE — PATIENT INSTRUCTIONS
Use one drop of pred forte in L eye every 2 hours for 4 days, if improving, cut to 3 hours for 4 days if not return to clinic   We have other locations at the [facility] and [facility]   Use dilating drop in L eye two times daily for one week   This will cause more light sensitivity and blurred vision at near with correction / glasses   If you feel back to normal you can stop this drop but not the pred forte     No contact lens  wear in L eye  Recommend back up glasses     See me in one week for follow up if not worsening this was scheduled for next Wed 1/4/22 at 10:00

## 2022-12-28 NOTE — PROGRESS NOTES
Assessment & Plan   (H57.12) Acute left eye pain  (primary encounter diagnosis)  Comment: Two day history of unilateral eye pain (up to 10/10), photophobia and red eye. Discussed differential with patient. Patient referred to EYE for further evaluation.  Plan: Adult Eye  Referral          (H57.89) Redness of eye  Comment:   Plan: Adult Eye  Referral          LEXI Rinocn [facility] ALLEGRA Godfrey is a 47 year old accompanied by her spouse, presenting for the following health issues:  Headache      HPI   Pain History:  When did you first notice your pain? - Acute Pain   Have you seen anyone else for your pain? No  Where in your body do you have pain? Headache  Onset/Duration: two days ago--gradually worsened throughout day. Yesterday with working on computer, symptoms worsened.  Description  Location: bilateral in the frontal area, radiates to left eye--behind eye  Character: sharp pain  Frequency: Pain comes when patient looks into light   Duration: Hours / all day   Wake with headaches: YES  Able to do daily activities when headache present: no   Intensity:  Severe--10  History of migraines--but this is worse  Progression of Symptoms:  improving and intermittent  Accompanying signs and symptoms:  Stiff neck: YES- left side  Neck or upper back pain: No  Sinus or URI symptoms no   Fever: No  Nausea or vomiting: YES--nauseated. No vomiting.  Dizziness: YES--mild  Numbness/tingling: No  Weakness: YES--mild  Visual changes: none  History  Head trauma: No  Family history of migraines: YES- mom when going through menopause   Daily pain medication use: no  Previous tests for headaches: No  Neurologist evaluation: No  Precipitating or Alleviating factors (light/sound/sleep/caffeine): Darkness helps, Excedrin   Therapies tried and outcome: Excedrin              Outcome - effective  Frequent/daily pain medication use: No  Improved throughout day. 3-4/10  currently    Less sleeping over the last week    Review of Systems   Constitutional, HEENT, cardiovascular, pulmonary, gi and gu systems are negative, except as otherwise noted.      Objective    /60 (BP Location: Right arm, Patient Position: Sitting, Cuff Size: Adult Regular)   Pulse 74   Temp 98.2  F (36.8  C) (Temporal)   Resp 17   Ht 1.524 m (5')   Wt 61.7 kg (136 lb)   SpO2 99%   BMI 26.56 kg/m    Body mass index is 26.56 kg/m .  Physical Exam   GENERAL: healthy, alert and no distress  EYES: left: scleral injection. PERRL. EOM intact. fluoroscein stain completed on left and wnl  HENT: ear canals and TM's normal, nose and mouth without ulcers or lesions  NECK: no adenopathy  RESP: lungs clear to auscultation - no rales, rhonchi or wheezes  CV: regular rate and rhythm, normal S1 S2, no S3 or S4    No results found for any visits on 12/28/22.

## 2022-12-28 NOTE — TELEPHONE ENCOUNTER
"Denies slurred speech, numbness/weakness on one side of the body.    It is getting better with Excedrin but she is concerned with her eye being red and would like to be seen. Patient is unable to find the right words to describe the feeling in her left eye- feels like something is in it.    Assisted patient to schedule an appointment for this afternoon.     YANETH Del Toro on 12/28/2022 at 10:02 AM      Reason for Disposition    Patient wants to be seen    Additional Information    Negative: Stiff neck (can't touch chin to chest)    Negative: SEVERE headache, sudden-onset (i.e., reaching maximum intensity within seconds to 1 hour)    Answer Assessment - Initial Assessment Questions  1. LOCATION: \"Where does it hurt?\"       Left frontal area around the eye. Left eye a little red.   2. ONSET: \"When did the headache start?\" (Minutes, hours or days)       Monday, was sensitivity to light, yesterday was a little worse, headache woke her up and was unable to even look at her phone.   3. PATTERN: \"Does the pain come and go, or has it been constant since it started?\"      When she looks at light, when she opens her right eye the pain is still in her left eye, and when she opens left eye, the pain is in the left eye.   4. SEVERITY: \"How bad is the pain?\" and \"What does it keep you from doing?\"  (e.g., Scale 1-10; mild, moderate, or severe)    - MILD (1-3): doesn't interfere with normal activities     - MODERATE (4-7): interferes with normal activities or awakens from sleep     - SEVERE (8-10): excruciating pain, unable to do any normal activities         Moderate- Severe due to the light sensitivity  5. RECURRENT SYMPTOM: \"Have you ever had headaches before?\" If Yes, ask: \"When was the last time?\" and \"What happened that time?\"       Last migraine was about 20+ years ago.   6. CAUSE: \"What do you think is causing the headache?\"      Decreased sleep due to menopausal sleep issues. Has had a moderate headache since last week but " "ibuprofen was helping.   7. MIGRAINE: \"Have you been diagnosed with migraine headaches?\" If Yes, ask: \"Is this headache similar?\"       Yes similar she thinks  8. HEAD INJURY: \"Has there been any recent injury to the head?\"       no  9. OTHER SYMPTOMS: \"Do you have any other symptoms?\" (fever, stiff neck, eye pain, sore throat, cold symptoms)      Eye pain, neck is slightly tense.  Eye pain is described as stabbing/unbearable. Neck pain is described as intermittent, and has a pain when she turns it if she turns too far. The left sided is hurting.   10. PREGNANCY: \"Is there any chance you are pregnant?\" \"When was your last menstrual period?\"        No    Protocols used: HEADACHE-A-OH      "

## 2023-01-04 ENCOUNTER — ALLIED HEALTH/NURSE VISIT (OUTPATIENT)
Dept: OPTOMETRY | Facility: CLINIC | Age: 48
End: 2023-01-04
Payer: COMMERCIAL

## 2023-01-04 DIAGNOSIS — H20.00 ACUTE IRITIS OF LEFT EYE: Primary | ICD-10-CM

## 2023-01-04 PROCEDURE — 92012 INTRM OPH EXAM EST PATIENT: CPT | Performed by: OPTOMETRIST

## 2023-01-04 ASSESSMENT — CUP TO DISC RATIO: OS_RATIO: 0.2

## 2023-01-04 ASSESSMENT — TONOMETRY
OS_IOP_MMHG: 14
IOP_METHOD: APPLANATION

## 2023-01-04 ASSESSMENT — SLIT LAMP EXAM - LIDS: COMMENTS: NORMAL

## 2023-01-04 ASSESSMENT — VISUAL ACUITY
METHOD: SNELLEN - LINEAR
OD_CC: 20/20
OS_CC: 20/20

## 2023-01-04 NOTE — PROGRESS NOTES
Chief Complaint   Patient presents with     Iritis      Did not use dilating drops until last night as pharmacy was unable to get  Vision is foggy but wearing single vision distance prescription and taking off for computer as she does not have a progressive addition lens and usually wears bifocal contacts       Using PF q 3 hours getting better   Using in the night q2h and now q3h        Eye Comfort: good        Medical, surgical and family histories reviewed and updated 1/4/2023.       OBJECTIVE: See Ophthalmology exam    ASSESSMENT:    ICD-10-CM    1. Acute iritis of left eye  H20.00       improving     PLAN:   Discontinue cyclopentolate  Taper PF four times daily for one week  Three times daily for one week( will follow up 1/18 unless worsens then sooner)  Two times daily for one week then once daily one week and discontinue   May resume contact lens wear at two times daily waiting 10 min after drop instillation and after removal      Camila Tolentino OD

## 2023-01-10 ENCOUNTER — TELEPHONE (OUTPATIENT)
Dept: OPTOMETRY | Facility: CLINIC | Age: 48
End: 2023-01-10

## 2023-01-10 DIAGNOSIS — H20.00 ACUTE IRITIS OF LEFT EYE: Primary | ICD-10-CM

## 2023-01-10 NOTE — TELEPHONE ENCOUNTER
Bekah called, feels it is getting worse again, not as bad as initial visit but not as good as it was  Using PF three times daily left eye   I told her to do a couple extra drops today, then four times daily until she sees me in a week on 1/18, and if it worsens sooner.    Camila Tolentino OD

## 2023-01-18 ENCOUNTER — ALLIED HEALTH/NURSE VISIT (OUTPATIENT)
Dept: OPTOMETRY | Facility: CLINIC | Age: 48
End: 2023-01-18
Payer: COMMERCIAL

## 2023-01-18 DIAGNOSIS — H20.00 ACUTE IRITIS OF LEFT EYE: Primary | ICD-10-CM

## 2023-01-18 PROCEDURE — 92012 INTRM OPH EXAM EST PATIENT: CPT | Performed by: OPTOMETRIST

## 2023-01-18 ASSESSMENT — VISUAL ACUITY
OS_CC: 20/20
OS_CC+: -1
METHOD: SNELLEN - LINEAR
CORRECTION_TYPE: GLASSES

## 2023-01-18 ASSESSMENT — SLIT LAMP EXAM - LIDS: COMMENTS: NORMAL

## 2023-01-18 NOTE — PROGRESS NOTES
Chief Complaint   Patient presents with     Iritis Follow Up   Patient states left eye is doing way better. No more pain in that eye just a little light sensitive that she can tell.    She is currently using the drops 3 times a day starting today. She was doing 4 times a day after after she called on the 10th until today and she states that helped because it was feeling worse but seemed to get better.     She has not used contact lenses at all - wants to know when she can            Eloisa Kay - Optometric Assistant      See Review Of Systems       Medical, surgical and family histories reviewed and updated 1/18/2023.         OBJECTIVE: See Ophthalmology exam    ASSESSMENT:    ICD-10-CM    1. Acute iritis of left eye  H20.00        resolved   PLAN:  discusse PF taper three times daily for one week, two times daily for one week, once daily for one week then discontinue   Could resume contact lens  Wear at once daily or two times daily of drops  Recommend progressive addition lens   Camila Tolentino OD

## 2023-02-17 ENCOUNTER — TRANSFERRED RECORDS (OUTPATIENT)
Dept: HEALTH INFORMATION MANAGEMENT | Facility: CLINIC | Age: 48
End: 2023-02-17
Payer: COMMERCIAL

## 2023-02-22 ENCOUNTER — HOSPITAL ENCOUNTER (OUTPATIENT)
Dept: CT IMAGING | Facility: CLINIC | Age: 48
Discharge: HOME | End: 2023-02-22
Attending: INTERNAL MEDICINE
Payer: COMMERCIAL

## 2023-02-22 ENCOUNTER — LAB (OUTPATIENT)
Dept: LAB | Facility: CLINIC | Age: 48
End: 2023-02-22
Attending: INTERNAL MEDICINE
Payer: COMMERCIAL

## 2023-02-22 DIAGNOSIS — R10.32 ABDOMINAL PAIN, LEFT LOWER QUADRANT: ICD-10-CM

## 2023-02-22 DIAGNOSIS — R10.32 LLQ ABDOMINAL PAIN: ICD-10-CM

## 2023-02-22 LAB
BASOPHILS # BLD AUTO: 0 10E3/UL (ref 0–0.2)
BASOPHILS NFR BLD AUTO: 0 %
EOSINOPHIL # BLD AUTO: 0.1 10E3/UL (ref 0–0.7)
EOSINOPHIL NFR BLD AUTO: 1 %
ERYTHROCYTE [DISTWIDTH] IN BLOOD BY AUTOMATED COUNT: 13 % (ref 10–15)
HCT VFR BLD AUTO: 39.8 % (ref 35–47)
HGB BLD-MCNC: 12.6 G/DL (ref 11.7–15.7)
IMM GRANULOCYTES # BLD: 0 10E3/UL
IMM GRANULOCYTES NFR BLD: 0 %
LYMPHOCYTES # BLD AUTO: 2.6 10E3/UL (ref 0.8–5.3)
LYMPHOCYTES NFR BLD AUTO: 25 %
MCH RBC QN AUTO: 30.1 PG (ref 26.5–33)
MCHC RBC AUTO-ENTMCNC: 31.7 G/DL (ref 31.5–36.5)
MCV RBC AUTO: 95 FL (ref 78–100)
MONOCYTES # BLD AUTO: 0.8 10E3/UL (ref 0–1.3)
MONOCYTES NFR BLD AUTO: 8 %
NEUTROPHILS # BLD AUTO: 6.5 10E3/UL (ref 1.6–8.3)
NEUTROPHILS NFR BLD AUTO: 66 %
NRBC # BLD AUTO: 0 10E3/UL
NRBC BLD AUTO-RTO: 0 /100
PLATELET # BLD AUTO: 227 10E3/UL (ref 150–450)
RBC # BLD AUTO: 4.19 10E6/UL (ref 3.8–5.2)
WBC # BLD AUTO: 10 10E3/UL (ref 4–11)

## 2023-02-22 PROCEDURE — 74177 CT ABD & PELVIS W/CONTRAST: CPT

## 2023-02-22 PROCEDURE — 36415 COLL VENOUS BLD VENIPUNCTURE: CPT

## 2023-02-22 PROCEDURE — 250N000011 HC RX IP 250 OP 636: Performed by: INTERNAL MEDICINE

## 2023-02-22 PROCEDURE — 85025 COMPLETE CBC W/AUTO DIFF WBC: CPT

## 2023-02-22 PROCEDURE — 250N000009 HC RX 250: Performed by: INTERNAL MEDICINE

## 2023-02-22 RX ORDER — IOPAMIDOL 755 MG/ML
500 INJECTION, SOLUTION INTRAVASCULAR ONCE
Status: COMPLETED | OUTPATIENT
Start: 2023-02-22 | End: 2023-02-22

## 2023-02-22 RX ADMIN — IOPAMIDOL 69 ML: 755 INJECTION, SOLUTION INTRAVENOUS at 10:41

## 2023-02-22 RX ADMIN — SODIUM CHLORIDE 47 ML: 9 INJECTION, SOLUTION INTRAVENOUS at 10:42

## 2023-03-09 ENCOUNTER — VIRTUAL VISIT (OUTPATIENT)
Dept: PEDIATRICS | Facility: CLINIC | Age: 48
End: 2023-03-09
Payer: COMMERCIAL

## 2023-03-09 ENCOUNTER — LAB (OUTPATIENT)
Dept: LAB | Facility: CLINIC | Age: 48
End: 2023-03-09
Payer: COMMERCIAL

## 2023-03-09 DIAGNOSIS — R05.1 ACUTE COUGH: ICD-10-CM

## 2023-03-09 DIAGNOSIS — R52 BODY ACHES: ICD-10-CM

## 2023-03-09 DIAGNOSIS — R52 BODY ACHES: Primary | ICD-10-CM

## 2023-03-09 LAB
FLUAV AG SPEC QL IA: NEGATIVE
FLUBV AG SPEC QL IA: NEGATIVE

## 2023-03-09 PROCEDURE — U0003 INFECTIOUS AGENT DETECTION BY NUCLEIC ACID (DNA OR RNA); SEVERE ACUTE RESPIRATORY SYNDROME CORONAVIRUS 2 (SARS-COV-2) (CORONAVIRUS DISEASE [COVID-19]), AMPLIFIED PROBE TECHNIQUE, MAKING USE OF HIGH THROUGHPUT TECHNOLOGIES AS DESCRIBED BY CMS-2020-01-R: HCPCS

## 2023-03-09 PROCEDURE — U0005 INFEC AGEN DETEC AMPLI PROBE: HCPCS

## 2023-03-09 PROCEDURE — 99213 OFFICE O/P EST LOW 20 MIN: CPT | Mod: VID | Performed by: PEDIATRICS

## 2023-03-09 PROCEDURE — 87804 INFLUENZA ASSAY W/OPTIC: CPT

## 2023-03-09 RX ORDER — COVID-19 MOLECULAR TEST ASSAY
KIT MISCELLANEOUS SEE ADMIN INSTRUCTIONS
COMMUNITY
Start: 2022-06-05 | End: 2024-01-15

## 2023-03-09 NOTE — PATIENT INSTRUCTIONS
Continue with your ibuprofen - take with food.  You can take this every 6 hours as needed.    Antihistamine - over the counter zyrtec or claritin

## 2023-03-09 NOTE — PROGRESS NOTES
"Bekah is a 47 year old who is being evaluated via a billable telephone visit.    Switched to phone visit - video jadon not working.    A/P:   Cough and body aches - concerning for influenza vs covid vs viral URI.  At this point does not sound viral, however not able to examine patient in person.   --flu, covid test  --see PI for symptomatic cares    She was in [place] Came back and was experiencing the symptoms since [place] 3/5/23        Subjective   Bekah is a 47 year old, presenting for the following health issues:  Cold Symptoms      HPI     Acute Illness  Acute illness concerns: Cold  Onset/Duration: 5  Symptoms:  Fever: No  Chills/Sweats: No  Headache (location?): YES  Sinus Pressure: YES  Conjunctivitis:  No  Ear Pain: no  Rhinorrhea: No  Congestion: YES  Sore Throat: YES - last night, but on and off and better today.  Then feels worse towards the end of the day.   Cough: YES-non-productive  Wheeze: No  Decreased Appetite: No  Nausea: No  Vomiting: No  Diarrhea: No  Dysuria/Freq.: No  Dysuria or Hematuria: No  Fatigue/Achiness: YES  Sick/Strep Exposure: No  Therapies tried and outcome: IB - feeling better with this. Took 600mg every 6 hours.     Coughing can be intense. Last night tried mucinex and benadryl. Last night felt nausea and headache. She feels \"so sick.\"    COVID test - 5 days ago was negative.            Review of Systems         Objective           Vitals:  No vitals were obtained today due to virtual visit.    Physical Exam   GENERAL: Healthy, alert and no distress  EYES: Eyes grossly normal to inspection.  No discharge or erythema, or obvious scleral/conjunctival abnormalities.  RESP: No audible wheeze, cough, or visible cyanosis.  No visible retractions or increased work of breathing.    SKIN: Visible skin clear. No significant rash, abnormal pigmentation or lesions.  NEURO: Cranial nerves grossly intact.  Mentation and speech appropriate for age.  PSYCH: Mentation appears normal, affect " normal/bright, judgement and insight intact, normal speech and appearance well-groomed.              Phone visit 8 min

## 2023-03-10 LAB — SARS-COV-2 RNA RESP QL NAA+PROBE: NEGATIVE

## 2023-07-03 ENCOUNTER — PATIENT OUTREACH (OUTPATIENT)
Dept: CARE COORDINATION | Facility: CLINIC | Age: 48
End: 2023-07-03
Payer: COMMERCIAL

## 2023-07-17 ENCOUNTER — PATIENT OUTREACH (OUTPATIENT)
Dept: CARE COORDINATION | Facility: CLINIC | Age: 48
End: 2023-07-17
Payer: COMMERCIAL

## 2023-08-14 ENCOUNTER — PATIENT OUTREACH (OUTPATIENT)
Dept: CARE COORDINATION | Facility: CLINIC | Age: 48
End: 2023-08-14
Payer: COMMERCIAL

## 2023-09-09 ENCOUNTER — HEALTH MAINTENANCE LETTER (OUTPATIENT)
Age: 48
End: 2023-09-09

## 2023-11-18 ENCOUNTER — HEALTH MAINTENANCE LETTER (OUTPATIENT)
Age: 48
End: 2023-11-18

## 2024-01-12 ASSESSMENT — ENCOUNTER SYMPTOMS
NAUSEA: 0
HEMATOCHEZIA: 0
MYALGIAS: 0
WEAKNESS: 0
CONSTIPATION: 0
HEADACHES: 0
FEVER: 0
HEARTBURN: 0
ARTHRALGIAS: 0
BREAST MASS: 0
NERVOUS/ANXIOUS: 0
DYSURIA: 0
JOINT SWELLING: 0
SORE THROAT: 0
DIARRHEA: 0
CHILLS: 0
ABDOMINAL PAIN: 0
HEMATURIA: 0
EYE PAIN: 0
COUGH: 0
SHORTNESS OF BREATH: 0
PALPITATIONS: 0
PARESTHESIAS: 0
FREQUENCY: 0
DIZZINESS: 0

## 2024-01-15 ENCOUNTER — OFFICE VISIT (OUTPATIENT)
Dept: PEDIATRICS | Facility: CLINIC | Age: 49
End: 2024-01-15
Payer: COMMERCIAL

## 2024-01-15 ENCOUNTER — ANCILLARY PROCEDURE (OUTPATIENT)
Dept: MAMMOGRAPHY | Facility: CLINIC | Age: 49
End: 2024-01-15
Attending: INTERNAL MEDICINE
Payer: COMMERCIAL

## 2024-01-15 VITALS
RESPIRATION RATE: 20 BRPM | BODY MASS INDEX: 28.45 KG/M2 | DIASTOLIC BLOOD PRESSURE: 68 MMHG | SYSTOLIC BLOOD PRESSURE: 105 MMHG | OXYGEN SATURATION: 100 % | TEMPERATURE: 97.7 F | HEART RATE: 64 BPM | HEIGHT: 60 IN | WEIGHT: 144.9 LBS

## 2024-01-15 DIAGNOSIS — Z12.31 VISIT FOR SCREENING MAMMOGRAM: ICD-10-CM

## 2024-01-15 DIAGNOSIS — Z13.1 SCREENING FOR DIABETES MELLITUS: ICD-10-CM

## 2024-01-15 DIAGNOSIS — Z00.00 ROUTINE GENERAL MEDICAL EXAMINATION AT A HEALTH CARE FACILITY: Primary | ICD-10-CM

## 2024-01-15 DIAGNOSIS — M54.50 CHRONIC LEFT-SIDED LOW BACK PAIN WITHOUT SCIATICA: ICD-10-CM

## 2024-01-15 DIAGNOSIS — Z92.29 UP TO DATE WITH HEPATITIS B IMMUNIZATION: ICD-10-CM

## 2024-01-15 DIAGNOSIS — G89.29 CHRONIC LEFT-SIDED LOW BACK PAIN WITHOUT SCIATICA: ICD-10-CM

## 2024-01-15 DIAGNOSIS — Z13.220 LIPID SCREENING: ICD-10-CM

## 2024-01-15 LAB
CHOLEST SERPL-MCNC: 282 MG/DL
FASTING STATUS PATIENT QL REPORTED: YES
HBA1C MFR BLD: 5.6 % (ref 0–5.6)
HBV SURFACE AB SERPL IA-ACNC: <3.5 M[IU]/ML
HBV SURFACE AB SERPL IA-ACNC: NONREACTIVE M[IU]/ML
HDLC SERPL-MCNC: 85 MG/DL
LDLC SERPL CALC-MCNC: 178 MG/DL
NONHDLC SERPL-MCNC: 197 MG/DL
TRIGL SERPL-MCNC: 97 MG/DL

## 2024-01-15 PROCEDURE — 36415 COLL VENOUS BLD VENIPUNCTURE: CPT | Performed by: INTERNAL MEDICINE

## 2024-01-15 PROCEDURE — 83036 HEMOGLOBIN GLYCOSYLATED A1C: CPT | Performed by: INTERNAL MEDICINE

## 2024-01-15 PROCEDURE — 77067 SCR MAMMO BI INCL CAD: CPT | Mod: TC | Performed by: RADIOLOGY

## 2024-01-15 PROCEDURE — 86706 HEP B SURFACE ANTIBODY: CPT | Performed by: INTERNAL MEDICINE

## 2024-01-15 PROCEDURE — 99396 PREV VISIT EST AGE 40-64: CPT | Performed by: INTERNAL MEDICINE

## 2024-01-15 PROCEDURE — 80061 LIPID PANEL: CPT | Performed by: INTERNAL MEDICINE

## 2024-01-15 ASSESSMENT — ENCOUNTER SYMPTOMS
SHORTNESS OF BREATH: 0
SORE THROAT: 0
FREQUENCY: 0
DYSURIA: 0
ARTHRALGIAS: 0
JOINT SWELLING: 0
MYALGIAS: 0
HEMATURIA: 0
FEVER: 0
NAUSEA: 0
HEADACHES: 0
HEMATOCHEZIA: 0
DIARRHEA: 0
EYE PAIN: 0
PALPITATIONS: 0
WEAKNESS: 0
COUGH: 0
CONSTIPATION: 0
BREAST MASS: 0
HEARTBURN: 0
PARESTHESIAS: 0
ABDOMINAL PAIN: 0
CHILLS: 0
DIZZINESS: 0
NERVOUS/ANXIOUS: 0

## 2024-01-15 ASSESSMENT — PAIN SCALES - GENERAL: PAINLEVEL: MODERATE PAIN (4)

## 2024-01-15 NOTE — PROGRESS NOTES
SUBJECTIVE:   Bekah is a 48 year old, presenting for the following:  Physical        1/15/2024     9:36 AM   Additional Questions   Roomed by Lynn Dougherty   Accompanied by N/A       Healthy Habits:     Getting at least 3 servings of Calcium per day:  Yes    Bi-annual eye exam:  Yes    Dental care twice a year:  Yes    Sleep apnea or symptoms of sleep apnea:  None    Diet:  Regular (no restrictions)    Frequency of exercise:  None    Taking medications regularly:  Yes    Medication side effects:  None    Additional concerns today:  Yes                                                                                                                                                                                                                                                                                                                                                                                                                                                                                       Trazodone has not been helpful for sleep.     Ongoing low back pain and L hip. Has previously had MRI, seen ortho and chiropractor. Hasn't been able to go back to chiropractor due to insurance. Now for past year has had worsening L hip and low back pain that is worst at night with turning or actively cleaning the house.     Today's PHQ-2 Score:       1/15/2024     9:18 AM   PHQ-2 ( 1999 [employer])   Q1: Little interest or pleasure in doing things 0   Q2: Feeling down, depressed or hopeless 0   PHQ-2 Score 0   Q1: Little interest or pleasure in doing things Not at all   Q2: Feeling down, depressed or hopeless Not at all   PHQ-2 Score 0         Social History     Tobacco Use    Smoking status: Never    Smokeless tobacco: Never   Substance Use Topics    Alcohol use: Yes     Comment: social drinker, hh with friends, parties, etc. 2 drinks max             1/12/2024    10:16 AM   Alcohol Use   Prescreen: >3 drinks/day or >7  drinks/week? No     Reviewed orders with patient.  Reviewed health maintenance and updated orders accordingly - Yes  Patient Active Problem List   Diagnosis    Allergic rhinitis    Lumbago    Nonallopathic lesion of sacral region    Nonallopathic lesion of thoracic region    Blood type A+    Pre-diabetes     No past surgical history on file.    Social History     Tobacco Use    Smoking status: Never    Smokeless tobacco: Never   Substance Use Topics    Alcohol use: Yes     Comment: social drinker, hh with friends, parties, etc. 2 drinks max     Family History   Problem Relation Age of Onset    Lipids Mother     Diabetes Mother     Hypertension Mother     Depression Mother     Obesity Mother     Cerebrovascular Disease Father     Diabetes Brother            Breast Cancer Screenin/1/2022     9:46 AM 8/15/2022    10:08 AM   Breast CA Risk Assessment (FHS-7)   Do you have a family history of breast, colon, or ovarian cancer? No / Unknown No / Unknown       click delete button to remove this line now  Mammogram Screening: Recommended annual mammography  Pertinent mammograms are reviewed under the imaging tab.    History of abnormal Pap smear: NO - age 30-65 PAP every 5 years with negative HPV co-testing recommended      Latest Ref Rng & Units 2020     8:50 AM 2020     8:42 AM 2012     2:16 PM   PAP / HPV   PAP (Historical)   NIL  NIL    HPV 16 DNA NEG^Negative Negative      HPV 18 DNA NEG^Negative Negative      Other HR HPV NEG^Negative Negative        Reviewed and updated as needed this visit by clinical staff   Tobacco  Allergies  Meds              Reviewed and updated as needed this visit by Provider     Meds                  Review of Systems   Constitutional:  Negative for chills and fever.   HENT:  Negative for congestion, ear pain, hearing loss and sore throat.    Eyes:  Negative for pain and visual disturbance.   Respiratory:  Negative for cough and shortness of breath.   "  Cardiovascular:  Negative for chest pain, palpitations and peripheral edema.   Gastrointestinal:  Negative for abdominal pain, constipation, diarrhea, heartburn, hematochezia and nausea.   Breasts:  Negative for tenderness, breast mass and discharge.   Genitourinary:  Negative for dysuria, frequency, genital sores, hematuria, pelvic pain, urgency, vaginal bleeding and vaginal discharge.   Musculoskeletal:  Negative for arthralgias, joint swelling and myalgias.   Skin:  Negative for rash.   Neurological:  Negative for dizziness, weakness, headaches and paresthesias.   Psychiatric/Behavioral:  Negative for mood changes. The patient is not nervous/anxious.         OBJECTIVE:   /68 (BP Location: Right arm, Patient Position: Sitting, Cuff Size: Adult Regular)   Pulse 64   Temp 97.7  F (36.5  C) (Tympanic)   Resp 20   Ht 1.527 m (5' 0.12\")   Wt 65.7 kg (144 lb 14.4 oz)   SpO2 100%   BMI 28.19 kg/m    Physical Exam  GENERAL: alert and no distress  EYES: Eyes grossly normal to inspection, PERRL and conjunctivae and sclerae normal  HENT: ear canals and TM's normal, nose and mouth without ulcers or lesions  NECK: no adenopathy, no asymmetry, masses, or scars  RESP: lungs clear to auscultation - no rales, rhonchi or wheezes  CV: regular rate and rhythm, normal S1 S2, no S3 or S4, no murmur, click or rub, no peripheral edema  ABDOMEN: soft, nontender, no hepatosplenomegaly, no masses and bowel sounds normal  MS: no gross musculoskeletal defects noted, no edema. Tenderness localizes to L SI joint and lower paraspinal lumbar muscles  SKIN: no suspicious lesions or rashes  NEURO: Normal strength and tone, mentation intact and speech normal  PSYCH: mentation appears normal, affect normal/bright  LYMPH: no cervical, supraclavicular, axillary, or inguinal adenopathy      ASSESSMENT/PLAN:   Routine general medical examination at a health care facility  Counseling as below, pap up to date. Discussed decreased libido and " limited treatment options.    Lipid screening  - Lipid panel reflex to direct LDL Fasting    Screening for diabetes mellitus  - Hemoglobin A1c    Up to date with hepatitis B immunization  Unsure if she is Hep B immune, will screen with labs.   - Hepatitis B Surface Antibody    Chronic left-sided low back pain without sciatica  Anticipate likely lumbar back pain vs SI joint pain, on exam doesn't seem consistent with trochanteric bursitis or localize to hip. Patient interested in starting with chiropractor prior to referral to physical therapy or ortho; referral placed.  - Chiropractic Referral; Future          COUNSELING:  Reviewed preventive health counseling, as reflected in patient instructions       Regular exercise       Healthy diet/nutrition       Family planning       Colorectal Cancer Screening        She reports that she has never smoked. She has never used smokeless tobacco.          Gisselle Bland MD  [facility]

## 2024-01-15 NOTE — PATIENT INSTRUCTIONS
[email or web address] for back pain - let me know if you want an orthopedic referral as well.     Think about shingles and flu vaccines.

## 2025-03-19 ENCOUNTER — OFFICE VISIT (OUTPATIENT)
Dept: PEDIATRICS | Facility: CLINIC | Age: 50
End: 2025-03-19
Payer: COMMERCIAL

## 2025-03-19 VITALS
WEIGHT: 144.4 LBS | HEART RATE: 72 BPM | OXYGEN SATURATION: 98 % | RESPIRATION RATE: 16 BRPM | TEMPERATURE: 97.6 F | HEIGHT: 59 IN | BODY MASS INDEX: 29.11 KG/M2 | SYSTOLIC BLOOD PRESSURE: 108 MMHG | DIASTOLIC BLOOD PRESSURE: 72 MMHG

## 2025-03-19 DIAGNOSIS — J30.9 ALLERGIC RHINITIS, UNSPECIFIED SEASONALITY, UNSPECIFIED TRIGGER: ICD-10-CM

## 2025-03-19 DIAGNOSIS — Z12.31 ENCOUNTER FOR SCREENING MAMMOGRAM FOR BREAST CANCER: ICD-10-CM

## 2025-03-19 DIAGNOSIS — Z12.4 CERVICAL CANCER SCREENING: ICD-10-CM

## 2025-03-19 DIAGNOSIS — Z00.00 ROUTINE GENERAL MEDICAL EXAMINATION AT A HEALTH CARE FACILITY: Primary | ICD-10-CM

## 2025-03-19 DIAGNOSIS — G47.01 INSOMNIA DUE TO MEDICAL CONDITION: ICD-10-CM

## 2025-03-19 PROCEDURE — 90746 HEPB VACCINE 3 DOSE ADULT IM: CPT | Performed by: INTERNAL MEDICINE

## 2025-03-19 PROCEDURE — 99459 PELVIC EXAMINATION: CPT | Performed by: INTERNAL MEDICINE

## 2025-03-19 PROCEDURE — G2211 COMPLEX E/M VISIT ADD ON: HCPCS | Performed by: INTERNAL MEDICINE

## 2025-03-19 PROCEDURE — 99396 PREV VISIT EST AGE 40-64: CPT | Mod: 25 | Performed by: INTERNAL MEDICINE

## 2025-03-19 PROCEDURE — 90472 IMMUNIZATION ADMIN EACH ADD: CPT | Performed by: INTERNAL MEDICINE

## 2025-03-19 PROCEDURE — 3078F DIAST BP <80 MM HG: CPT | Performed by: INTERNAL MEDICINE

## 2025-03-19 PROCEDURE — 99213 OFFICE O/P EST LOW 20 MIN: CPT | Mod: 25 | Performed by: INTERNAL MEDICINE

## 2025-03-19 PROCEDURE — 1126F AMNT PAIN NOTED NONE PRSNT: CPT | Performed by: INTERNAL MEDICINE

## 2025-03-19 PROCEDURE — 3074F SYST BP LT 130 MM HG: CPT | Performed by: INTERNAL MEDICINE

## 2025-03-19 PROCEDURE — 90750 HZV VACC RECOMBINANT IM: CPT | Performed by: INTERNAL MEDICINE

## 2025-03-19 PROCEDURE — 90471 IMMUNIZATION ADMIN: CPT | Performed by: INTERNAL MEDICINE

## 2025-03-19 RX ORDER — FLUTICASONE PROPIONATE 50 MCG
1 SPRAY, SUSPENSION (ML) NASAL PRN
Qty: 16 ML | Refills: 11 | Status: SHIPPED | OUTPATIENT
Start: 2025-03-19

## 2025-03-19 RX ORDER — TRAZODONE HYDROCHLORIDE 50 MG/1
25-50 TABLET ORAL
Qty: 90 TABLET | Refills: 1 | Status: SHIPPED | OUTPATIENT
Start: 2025-03-19

## 2025-03-19 SDOH — HEALTH STABILITY: PHYSICAL HEALTH: ON AVERAGE, HOW MANY DAYS PER WEEK DO YOU ENGAGE IN MODERATE TO STRENUOUS EXERCISE (LIKE A BRISK WALK)?: 3 DAYS

## 2025-03-19 SDOH — HEALTH STABILITY: PHYSICAL HEALTH: ON AVERAGE, HOW MANY MINUTES DO YOU ENGAGE IN EXERCISE AT THIS LEVEL?: 10 MIN

## 2025-03-19 ASSESSMENT — PAIN SCALES - GENERAL: PAINLEVEL_OUTOF10: NO PAIN (0)

## 2025-03-19 ASSESSMENT — SOCIAL DETERMINANTS OF HEALTH (SDOH): HOW OFTEN DO YOU GET TOGETHER WITH FRIENDS OR RELATIVES?: ONCE A WEEK

## 2025-03-19 NOTE — PATIENT INSTRUCTIONS
Medications refilled, schedule mammogram. Shingles booster in 2-6 months. Hepatitis B you can boost in 1-2 months.

## 2025-03-19 NOTE — PROGRESS NOTES
"Preventive Care Visit  [facility] ALLEGRA Bland MD, Internal Medicine - Pediatrics  Mar 19, 2025      Assessment & Plan     Routine general medical examination at a health care facility  Up to date on colonoscopy. Feels like she has a good handle on grief management but will reach out if she needs further support. Shingles and Hep B vaccines today.     Cervical cancer screening  - HPV and Gynecologic Cytology Panel - Recommended Age 30 - 65 Years    Allergic rhinitis, unspecified seasonality, unspecified trigger  Doing well with prn medications.   - fluticasone (FLONASE) 50 MCG/ACT nasal spray; Spray 1 spray in nostril as needed for rhinitis or allergies.    Insomnia due to medical condition  Uses trazodone very intermittently but works well when she needs it. Refilled for prn use.   - traZODone (DESYREL) 50 MG tablet; Take 0.5-1 tablets (25-50 mg) by mouth nightly as needed for sleep.    Encounter for screening mammogram for breast cancer  - MA Screen Bilateral w/Vish; Future            BMI  Estimated body mass index is 28.69 kg/m  as calculated from the following:    Height as of this encounter: 1.511 m (4' 11.49\").    Weight as of this encounter: 65.5 kg (144 lb 6.4 oz).   Weight management plan: Discussed healthy diet and exercise guidelines        Patient Instructions   Medications refilled, schedule mammogram. Shingles booster in 2-6 months. Hepatitis B you can boost in 1-2 months.     Subjective   Bekah is a 49 year old, presenting for the following:  Physical        3/19/2025     1:14 PM   Additional Questions   Roomed by Lynn Dougherty   Accompanied by N/A          HPI  Overall doing well. Allergies have started back up again. Nephew  unexpectedly in a car accident last fall which has been hard for her and her family. Busy with work, and more fatigued than prior, but attributes it to some of the above issues.        Advance Care Planning  Patient does not have a Health Care " Directive: Discussed advance care planning with patient; however, patient declined at this time.      3/19/2025   General Health   How would you rate your overall physical health? Good   Feel stress (tense, anxious, or unable to sleep) To some extent   (!) STRESS CONCERN        3/19/2025   Nutrition   Three or more servings of calcium each day? (!) NO   Diet: Regular (no restrictions)   How many servings of fruit and vegetables per day? (!) 0-1   How many sweetened beverages each day? 0-1         3/19/2025   Exercise   Days per week of moderate/strenous exercise 3 days   Average minutes spent exercising at this level 10 min         3/19/2025   Social Factors   Frequency of gathering with friends or relatives Once a week   Worry food won't last until get money to buy more No   Food not last or not have enough money for food? No   Do you have housing? (Housing is defined as stable permanent housing and does not include staying ouside in a car, in a tent, in an abandoned building, in an overnight shelter, or couch-surfing.) Yes   Are you worried about losing your housing? No   Lack of transportation? No   Unable to get utilities (heat,electricity)? No         3/19/2025   Fall Risk   Fallen 2 or more times in the past year? No   Trouble with walking or balance? No          3/19/2025   Dental   Dentist two times every year? Yes     Today's PHQ-2 Score:       3/19/2025     1:05 PM   PHQ-2 ( 1999 Pfizer)   Q1: Little interest or pleasure in doing things 0   Q2: Feeling down, depressed or hopeless 1   PHQ-2 Score 1    Q1: Little interest or pleasure in doing things Not at all   Q2: Feeling down, depressed or hopeless Several days   PHQ-2 Score 1       Patient-reported         3/19/2025   Substance Use   Alcohol more than 3/day or more than 7/wk No   Do you use any other substances recreationally? No     Social History     Tobacco Use    Smoking status: Never    Smokeless tobacco: Never   Vaping Use    Vaping status: Never  Used   Substance Use Topics    Alcohol use: Yes     Comment: social drinker, hh with friends, parties, etc. 2 drinks max    Drug use: No           1/15/2024   LAST FHS-7 RESULTS   1st degree relative breast or ovarian cancer No   Any relative bilateral breast cancer No   Any male have breast cancer No   Any ONE woman have BOTH breast AND ovarian cancer No   Any woman with breast cancer before 50yrs No   2 or more relatives with breast AND/OR ovarian cancer No   2 or more relatives with breast AND/OR bowel cancer No        Mammogram Screening - After age 74- determine frequency with patient based on health status, life expectancy and patient goals        3/19/2025   STI Screening   New sexual partner(s) since last STI/HIV test? No     History of abnormal Pap smear: No - age 30- 64 PAP with HPV every 5 years recommended        Latest Ref Rng & Units 2/26/2020     8:50 AM 2/26/2020     8:42 AM 1/27/2012     2:16 PM   PAP / HPV   PAP (Historical)   NIL  NIL    HPV 16 DNA NEG^Negative Negative      HPV 18 DNA NEG^Negative Negative      Other HR HPV NEG^Negative Negative        ASCVD Risk   The 10-year ASCVD risk score (Keya MACARIO, et al., 2019) is: 0.9%    Values used to calculate the score:      Age: 49 years      Sex: Female      Is Non- : No      Diabetic: No      Tobacco smoker: No      Systolic Blood Pressure: 108 mmHg      Is BP treated: No      HDL Cholesterol: 85 mg/dL      Total Cholesterol: 282 mg/dL           Reviewed and updated as needed this visit by Provider     Meds                Patient Active Problem List   Diagnosis    Allergic rhinitis    Lumbago    Nonallopathic lesion of sacral region    Nonallopathic lesion of thoracic region    Blood type A+    Pre-diabetes     No past surgical history on file.    Social History     Tobacco Use    Smoking status: Never    Smokeless tobacco: Never   Substance Use Topics    Alcohol use: Yes     Comment: social drinker, hh with  "friends, parties, etc. 2 drinks max     Family History   Problem Relation Age of Onset    Lipids Mother     Diabetes Mother     Hypertension Mother     Depression Mother     Obesity Mother     Cerebrovascular Disease Father     Diabetes Brother              Review of Systems  Constitutional, neuro, ENT, endocrine, pulmonary, cardiac, gastrointestinal, genitourinary, musculoskeletal, integument and psychiatric systems are negative, except as otherwise noted.     Objective    Exam  /72 (BP Location: Right arm, Patient Position: Sitting, Cuff Size: Adult Regular)   Pulse 72   Temp 97.6  F (36.4  C) (Temporal)   Resp 16   Ht 1.511 m (4' 11.49\")   Wt 65.5 kg (144 lb 6.4 oz)   LMP 02/01/2022 (Within Days)   SpO2 98%   BMI 28.69 kg/m     Estimated body mass index is 28.69 kg/m  as calculated from the following:    Height as of this encounter: 1.511 m (4' 11.49\").    Weight as of this encounter: 65.5 kg (144 lb 6.4 oz).    Physical Exam  GENERAL: alert and no distress  EYES: Eyes grossly normal to inspection, PERRL and conjunctivae and sclerae normal  HENT: ear canals and TM's normal, nose and mouth without ulcers or lesions  NECK: no adenopathy, no asymmetry, masses, or scars  RESP: lungs clear to auscultation - no rales, rhonchi or wheezes  CV: regular rate and rhythm, normal S1 S2, no S3 or S4, no murmur, click or rub, no peripheral edema  ABDOMEN: soft, nontender, no hepatosplenomegaly, no masses and bowel sounds normal   (female) w/bimanual: normal female external genitalia, normal urethral meatus, normal vaginal mucosa, and normal cervix without masses or discharge  MS: no gross musculoskeletal defects noted, no edema  SKIN: no suspicious lesions or rashes  NEURO: Normal strength and tone, mentation intact and speech normal  PSYCH: mentation appears normal, affect normal/bright    The longitudinal plan of care for the diagnosis(es)/condition(s) as documented were addressed during this visit. Due to the " added complexity in care, I will continue to support Bekah in the subsequent management and with ongoing continuity of care.    Signed Electronically by: Gisselle Bland MD

## 2025-03-20 ENCOUNTER — PATIENT OUTREACH (OUTPATIENT)
Dept: CARE COORDINATION | Facility: CLINIC | Age: 50
End: 2025-03-20
Payer: COMMERCIAL

## 2025-03-25 LAB
BKR AP ASSOCIATED HPV REPORT: NORMAL
BKR LAB AP GYN ADEQUACY: NORMAL
BKR LAB AP GYN INTERPRETATION: NORMAL
BKR LAB AP PREVIOUS ABNORMAL: NORMAL
PATH REPORT.COMMENTS IMP SPEC: NORMAL
PATH REPORT.COMMENTS IMP SPEC: NORMAL
PATH REPORT.RELEVANT HX SPEC: NORMAL

## 2025-04-14 ENCOUNTER — ANCILLARY PROCEDURE (OUTPATIENT)
Dept: MAMMOGRAPHY | Facility: CLINIC | Age: 50
End: 2025-04-14
Attending: INTERNAL MEDICINE
Payer: COMMERCIAL

## 2025-04-14 DIAGNOSIS — Z12.31 ENCOUNTER FOR SCREENING MAMMOGRAM FOR BREAST CANCER: ICD-10-CM

## 2025-04-14 PROCEDURE — 77063 BREAST TOMOSYNTHESIS BI: CPT | Mod: TC | Performed by: RADIOLOGY

## 2025-04-14 PROCEDURE — 77067 SCR MAMMO BI INCL CAD: CPT | Mod: TC | Performed by: RADIOLOGY
